# Patient Record
Sex: FEMALE | Race: WHITE | NOT HISPANIC OR LATINO | Employment: FULL TIME | URBAN - METROPOLITAN AREA
[De-identification: names, ages, dates, MRNs, and addresses within clinical notes are randomized per-mention and may not be internally consistent; named-entity substitution may affect disease eponyms.]

---

## 2018-10-05 ENCOUNTER — TRANSCRIBE ORDERS (OUTPATIENT)
Dept: LAB | Facility: HOSPITAL | Age: 23
End: 2018-10-05

## 2018-10-05 ENCOUNTER — HOSPITAL ENCOUNTER (OUTPATIENT)
Dept: ULTRASOUND IMAGING | Facility: HOSPITAL | Age: 23
Discharge: HOME | End: 2018-10-05
Admitting: PHYSICIAN ASSISTANT

## 2018-10-05 ENCOUNTER — TRANSCRIBE ORDERS (OUTPATIENT)
Dept: ADMINISTRATIVE | Facility: HOSPITAL | Age: 23
End: 2018-10-05

## 2018-10-05 ENCOUNTER — LAB (OUTPATIENT)
Dept: LAB | Facility: HOSPITAL | Age: 23
End: 2018-10-05

## 2018-10-05 DIAGNOSIS — R10.11 RUQ PAIN: ICD-10-CM

## 2018-10-05 DIAGNOSIS — R52 PAIN: Primary | ICD-10-CM

## 2018-10-05 DIAGNOSIS — R52 PAIN: ICD-10-CM

## 2018-10-05 DIAGNOSIS — R10.11 RUQ PAIN: Primary | ICD-10-CM

## 2018-10-05 LAB
ALBUMIN SERPL-MCNC: 4.4 G/DL (ref 3.2–4.8)
ALBUMIN/GLOB SERPL: 1.6 G/DL (ref 1.5–2.5)
ALP SERPL-CCNC: 178 U/L (ref 25–100)
ALT SERPL W P-5'-P-CCNC: 309 U/L (ref 7–40)
ANION GAP SERPL CALCULATED.3IONS-SCNC: 10 MMOL/L (ref 3–11)
AST SERPL-CCNC: 252 U/L (ref 0–33)
BASOPHILS # BLD AUTO: 0.02 10*3/MM3 (ref 0–0.2)
BASOPHILS NFR BLD AUTO: 0.3 % (ref 0–1)
BILIRUB CONJ SERPL-MCNC: 2.8 MG/DL (ref 0–0.2)
BILIRUB SERPL-MCNC: 4.2 MG/DL (ref 0.3–1.2)
BUN BLD-MCNC: 6 MG/DL (ref 9–23)
BUN/CREAT SERPL: 7.9 (ref 7–25)
CALCIUM SPEC-SCNC: 9.6 MG/DL (ref 8.7–10.4)
CHLORIDE SERPL-SCNC: 103 MMOL/L (ref 99–109)
CO2 SERPL-SCNC: 22 MMOL/L (ref 20–31)
CREAT BLD-MCNC: 0.76 MG/DL (ref 0.6–1.3)
DEPRECATED RDW RBC AUTO: 39.9 FL (ref 37–54)
EOSINOPHIL # BLD AUTO: 0.05 10*3/MM3 (ref 0–0.3)
EOSINOPHIL NFR BLD AUTO: 0.6 % (ref 0–3)
ERYTHROCYTE [DISTWIDTH] IN BLOOD BY AUTOMATED COUNT: 13.1 % (ref 11.3–14.5)
GFR SERPL CREATININE-BSD FRML MDRD: 95 ML/MIN/1.73
GLOBULIN UR ELPH-MCNC: 2.7 GM/DL
GLUCOSE BLD-MCNC: 100 MG/DL (ref 70–100)
HCT VFR BLD AUTO: 41.9 % (ref 34.5–44)
HGB BLD-MCNC: 13.7 G/DL (ref 11.5–15.5)
IMM GRANULOCYTES # BLD: 0.01 10*3/MM3 (ref 0–0.03)
IMM GRANULOCYTES NFR BLD: 0.1 % (ref 0–0.6)
LYMPHOCYTES # BLD AUTO: 1.42 10*3/MM3 (ref 0.6–4.8)
LYMPHOCYTES NFR BLD AUTO: 18.3 % (ref 24–44)
MCH RBC QN AUTO: 27.7 PG (ref 27–31)
MCHC RBC AUTO-ENTMCNC: 32.7 G/DL (ref 32–36)
MCV RBC AUTO: 84.6 FL (ref 80–99)
MONOCYTES # BLD AUTO: 0.68 10*3/MM3 (ref 0–1)
MONOCYTES NFR BLD AUTO: 8.7 % (ref 0–12)
NEUTROPHILS # BLD AUTO: 5.6 10*3/MM3 (ref 1.5–8.3)
NEUTROPHILS NFR BLD AUTO: 72 % (ref 41–71)
PLATELET # BLD AUTO: 378 10*3/MM3 (ref 150–450)
PMV BLD AUTO: 9.7 FL (ref 6–12)
POTASSIUM BLD-SCNC: 4.3 MMOL/L (ref 3.5–5.5)
PROT SERPL-MCNC: 7.1 G/DL (ref 5.7–8.2)
RBC # BLD AUTO: 4.95 10*6/MM3 (ref 3.89–5.14)
SODIUM BLD-SCNC: 135 MMOL/L (ref 132–146)
WBC NRBC COR # BLD: 7.78 10*3/MM3 (ref 3.5–10.8)

## 2018-10-05 PROCEDURE — 80053 COMPREHEN METABOLIC PANEL: CPT

## 2018-10-05 PROCEDURE — 36415 COLL VENOUS BLD VENIPUNCTURE: CPT

## 2018-10-05 PROCEDURE — 82248 BILIRUBIN DIRECT: CPT

## 2018-10-05 PROCEDURE — 76705 ECHO EXAM OF ABDOMEN: CPT

## 2018-10-05 PROCEDURE — 85025 COMPLETE CBC W/AUTO DIFF WBC: CPT

## 2018-10-06 ENCOUNTER — HOSPITAL ENCOUNTER (INPATIENT)
Facility: HOSPITAL | Age: 23
LOS: 7 days | Discharge: HOME | End: 2018-10-14
Attending: EMERGENCY MEDICINE | Admitting: SURGERY

## 2018-10-06 ENCOUNTER — TRANSCRIBE ORDERS (OUTPATIENT)
Dept: LAB | Facility: HOSPITAL | Age: 23
End: 2018-10-06

## 2018-10-06 ENCOUNTER — LAB (OUTPATIENT)
Dept: LAB | Facility: HOSPITAL | Age: 23
End: 2018-10-06

## 2018-10-06 DIAGNOSIS — R74.8 ABNORMAL LIVER ENZYMES: Primary | ICD-10-CM

## 2018-10-06 DIAGNOSIS — K80.20 CHOLELITHIASIS: ICD-10-CM

## 2018-10-06 DIAGNOSIS — R74.8 ABNORMAL LIVER ENZYMES: ICD-10-CM

## 2018-10-06 DIAGNOSIS — R74.8 ELEVATED LIVER ENZYMES: Primary | ICD-10-CM

## 2018-10-06 DIAGNOSIS — K80.50 CHOLEDOCHOLITHIASIS: ICD-10-CM

## 2018-10-06 DIAGNOSIS — K80.51 CALCULUS OF BILE DUCT WITHOUT CHOLANGITIS WITH OBSTRUCTION: ICD-10-CM

## 2018-10-06 LAB
ALBUMIN SERPL-MCNC: 4.37 G/DL (ref 3.2–4.8)
ALBUMIN SERPL-MCNC: 4.44 G/DL (ref 3.2–4.8)
ALBUMIN/GLOB SERPL: 1.4 G/DL (ref 1.5–2.5)
ALBUMIN/GLOB SERPL: 1.6 G/DL (ref 1.5–2.5)
ALP SERPL-CCNC: 199 U/L (ref 25–100)
ALP SERPL-CCNC: 209 U/L (ref 25–100)
ALT SERPL W P-5'-P-CCNC: 316 U/L (ref 7–40)
ALT SERPL W P-5'-P-CCNC: 341 U/L (ref 7–40)
ANION GAP SERPL CALCULATED.3IONS-SCNC: 10 MMOL/L (ref 3–11)
ANION GAP SERPL CALCULATED.3IONS-SCNC: 9 MMOL/L (ref 3–11)
AST SERPL-CCNC: 172 U/L (ref 0–33)
AST SERPL-CCNC: 203 U/L (ref 0–33)
B-HCG UR QL: NEGATIVE
BILIRUB SERPL-MCNC: 4.4 MG/DL (ref 0.3–1.2)
BILIRUB SERPL-MCNC: 5.3 MG/DL (ref 0.3–1.2)
BILIRUB UR QL STRIP: ABNORMAL
BUN BLD-MCNC: <5 MG/DL (ref 9–23)
BUN BLD-MCNC: <5 MG/DL (ref 9–23)
BUN/CREAT SERPL: ABNORMAL (ref 7–25)
BUN/CREAT SERPL: ABNORMAL (ref 7–25)
CALCIUM SPEC-SCNC: 9.5 MG/DL (ref 8.7–10.4)
CALCIUM SPEC-SCNC: 9.7 MG/DL (ref 8.7–10.4)
CHLORIDE SERPL-SCNC: 106 MMOL/L (ref 99–109)
CHLORIDE SERPL-SCNC: 106 MMOL/L (ref 99–109)
CLARITY UR: CLEAR
CO2 SERPL-SCNC: 22 MMOL/L (ref 20–31)
CO2 SERPL-SCNC: 24 MMOL/L (ref 20–31)
COLOR UR: YELLOW
CREAT BLD-MCNC: 0.69 MG/DL (ref 0.6–1.3)
CREAT BLD-MCNC: 0.73 MG/DL (ref 0.6–1.3)
GFR SERPL CREATININE-BSD FRML MDRD: 100 ML/MIN/1.73
GFR SERPL CREATININE-BSD FRML MDRD: 106 ML/MIN/1.73
GLOBULIN UR ELPH-MCNC: 2.8 GM/DL
GLOBULIN UR ELPH-MCNC: 3 GM/DL
GLUCOSE BLD-MCNC: 87 MG/DL (ref 70–100)
GLUCOSE BLD-MCNC: 99 MG/DL (ref 70–100)
GLUCOSE UR STRIP-MCNC: ABNORMAL MG/DL
HGB UR QL STRIP.AUTO: NEGATIVE
HOLD SPECIMEN: NORMAL
INTERNAL NEGATIVE CONTROL: NEGATIVE
INTERNAL POSITIVE CONTROL: POSITIVE
KETONES UR QL STRIP: NEGATIVE
LEUKOCYTE ESTERASE UR QL STRIP.AUTO: NEGATIVE
LIPASE SERPL-CCNC: 24 U/L (ref 6–51)
Lab: NORMAL
NITRITE UR QL STRIP: NEGATIVE
PH UR STRIP.AUTO: 6 [PH] (ref 5–8)
POTASSIUM BLD-SCNC: 3.7 MMOL/L (ref 3.5–5.5)
POTASSIUM BLD-SCNC: 4.3 MMOL/L (ref 3.5–5.5)
PROT SERPL-MCNC: 7.2 G/DL (ref 5.7–8.2)
PROT SERPL-MCNC: 7.4 G/DL (ref 5.7–8.2)
PROT UR QL STRIP: NEGATIVE
SODIUM BLD-SCNC: 137 MMOL/L (ref 132–146)
SODIUM BLD-SCNC: 140 MMOL/L (ref 132–146)
SP GR UR STRIP: <=1.005 (ref 1–1.03)
UROBILINOGEN UR QL STRIP: ABNORMAL

## 2018-10-06 PROCEDURE — 36415 COLL VENOUS BLD VENIPUNCTURE: CPT

## 2018-10-06 PROCEDURE — 25010000002 HYDROMORPHONE 1 MG/ML SOLUTION: Performed by: EMERGENCY MEDICINE

## 2018-10-06 PROCEDURE — 85025 COMPLETE CBC W/AUTO DIFF WBC: CPT | Performed by: EMERGENCY MEDICINE

## 2018-10-06 PROCEDURE — 80053 COMPREHEN METABOLIC PANEL: CPT

## 2018-10-06 PROCEDURE — 83690 ASSAY OF LIPASE: CPT

## 2018-10-06 PROCEDURE — 81025 URINE PREGNANCY TEST: CPT | Performed by: EMERGENCY MEDICINE

## 2018-10-06 PROCEDURE — 81003 URINALYSIS AUTO W/O SCOPE: CPT | Performed by: EMERGENCY MEDICINE

## 2018-10-06 PROCEDURE — 99284 EMERGENCY DEPT VISIT MOD MDM: CPT

## 2018-10-06 RX ORDER — SODIUM CHLORIDE 0.9 % (FLUSH) 0.9 %
10 SYRINGE (ML) INJECTION AS NEEDED
Status: DISCONTINUED | OUTPATIENT
Start: 2018-10-06 | End: 2018-10-14 | Stop reason: HOSPADM

## 2018-10-06 RX ADMIN — HYDROMORPHONE HYDROCHLORIDE 1 MG: 1 INJECTION, SOLUTION INTRAMUSCULAR; INTRAVENOUS; SUBCUTANEOUS at 23:46

## 2018-10-06 RX ADMIN — SODIUM CHLORIDE 1000 ML: 9 INJECTION, SOLUTION INTRAVENOUS at 23:43

## 2018-10-07 ENCOUNTER — ANESTHESIA (OUTPATIENT)
Dept: GASTROENTEROLOGY | Facility: HOSPITAL | Age: 23
End: 2018-10-07

## 2018-10-07 ENCOUNTER — APPOINTMENT (OUTPATIENT)
Dept: CT IMAGING | Facility: HOSPITAL | Age: 23
End: 2018-10-07

## 2018-10-07 ENCOUNTER — APPOINTMENT (OUTPATIENT)
Dept: MRI IMAGING | Facility: HOSPITAL | Age: 23
End: 2018-10-07

## 2018-10-07 ENCOUNTER — ANESTHESIA EVENT (OUTPATIENT)
Dept: GASTROENTEROLOGY | Facility: HOSPITAL | Age: 23
End: 2018-10-07

## 2018-10-07 ENCOUNTER — APPOINTMENT (OUTPATIENT)
Dept: GENERAL RADIOLOGY | Facility: HOSPITAL | Age: 23
End: 2018-10-07

## 2018-10-07 PROBLEM — R10.9 INTRACTABLE ABDOMINAL PAIN: Status: ACTIVE | Noted: 2018-10-07

## 2018-10-07 PROBLEM — R74.8 ELEVATED LIVER ENZYMES: Status: ACTIVE | Noted: 2018-10-07

## 2018-10-07 PROBLEM — K80.20 CHOLELITHIASIS: Status: ACTIVE | Noted: 2018-10-07

## 2018-10-07 PROBLEM — R17 JAUNDICE: Status: ACTIVE | Noted: 2018-10-07

## 2018-10-07 PROBLEM — E80.6 HYPERBILIRUBINEMIA: Status: ACTIVE | Noted: 2018-10-07

## 2018-10-07 PROBLEM — K80.50 CHOLEDOCHOLITHIASIS: Status: ACTIVE | Noted: 2018-10-07

## 2018-10-07 LAB
ALBUMIN SERPL-MCNC: 3.7 G/DL (ref 3.2–4.8)
ALBUMIN/GLOB SERPL: 1.6 G/DL (ref 1.5–2.5)
ALP SERPL-CCNC: 185 U/L (ref 25–100)
ALT SERPL W P-5'-P-CCNC: 269 U/L (ref 7–40)
ANION GAP SERPL CALCULATED.3IONS-SCNC: 7 MMOL/L (ref 3–11)
AST SERPL-CCNC: 128 U/L (ref 0–33)
BASOPHILS # BLD AUTO: 0.03 10*3/MM3 (ref 0–0.2)
BASOPHILS NFR BLD AUTO: 0.4 % (ref 0–1)
BILIRUB SERPL-MCNC: 4.6 MG/DL (ref 0.3–1.2)
BUN BLD-MCNC: <5 MG/DL (ref 9–23)
BUN/CREAT SERPL: ABNORMAL (ref 7–25)
CALCIUM SPEC-SCNC: 8.7 MG/DL (ref 8.7–10.4)
CHLORIDE SERPL-SCNC: 104 MMOL/L (ref 99–109)
CO2 SERPL-SCNC: 26 MMOL/L (ref 20–31)
CREAT BLD-MCNC: 0.65 MG/DL (ref 0.6–1.3)
DEPRECATED RDW RBC AUTO: 41.2 FL (ref 37–54)
DEPRECATED RDW RBC AUTO: 41.7 FL (ref 37–54)
EOSINOPHIL # BLD AUTO: 0.15 10*3/MM3 (ref 0–0.3)
EOSINOPHIL NFR BLD AUTO: 1.8 % (ref 0–3)
ERYTHROCYTE [DISTWIDTH] IN BLOOD BY AUTOMATED COUNT: 13.3 % (ref 11.3–14.5)
ERYTHROCYTE [DISTWIDTH] IN BLOOD BY AUTOMATED COUNT: 13.4 % (ref 11.3–14.5)
GFR SERPL CREATININE-BSD FRML MDRD: 114 ML/MIN/1.73
GLOBULIN UR ELPH-MCNC: 2.3 GM/DL
GLUCOSE BLD-MCNC: 96 MG/DL (ref 70–100)
HCT VFR BLD AUTO: 37.5 % (ref 34.5–44)
HCT VFR BLD AUTO: 40 % (ref 34.5–44)
HGB BLD-MCNC: 11.9 G/DL (ref 11.5–15.5)
HGB BLD-MCNC: 13.2 G/DL (ref 11.5–15.5)
IMM GRANULOCYTES # BLD: 0.01 10*3/MM3 (ref 0–0.03)
IMM GRANULOCYTES NFR BLD: 0.1 % (ref 0–0.6)
LYMPHOCYTES # BLD AUTO: 2.36 10*3/MM3 (ref 0.6–4.8)
LYMPHOCYTES NFR BLD AUTO: 28.9 % (ref 24–44)
MCH RBC QN AUTO: 27 PG (ref 27–31)
MCH RBC QN AUTO: 28.1 PG (ref 27–31)
MCHC RBC AUTO-ENTMCNC: 31.7 G/DL (ref 32–36)
MCHC RBC AUTO-ENTMCNC: 33 G/DL (ref 32–36)
MCV RBC AUTO: 85.1 FL (ref 80–99)
MCV RBC AUTO: 85.2 FL (ref 80–99)
MONOCYTES # BLD AUTO: 0.87 10*3/MM3 (ref 0–1)
MONOCYTES NFR BLD AUTO: 10.6 % (ref 0–12)
NEUTROPHILS # BLD AUTO: 4.75 10*3/MM3 (ref 1.5–8.3)
NEUTROPHILS NFR BLD AUTO: 58.2 % (ref 41–71)
PLATELET # BLD AUTO: 312 10*3/MM3 (ref 150–450)
PLATELET # BLD AUTO: 328 10*3/MM3 (ref 150–450)
PMV BLD AUTO: 10 FL (ref 6–12)
PMV BLD AUTO: 9.7 FL (ref 6–12)
POTASSIUM BLD-SCNC: 4 MMOL/L (ref 3.5–5.5)
PROT SERPL-MCNC: 6 G/DL (ref 5.7–8.2)
RBC # BLD AUTO: 4.4 10*6/MM3 (ref 3.89–5.14)
RBC # BLD AUTO: 4.7 10*6/MM3 (ref 3.89–5.14)
SODIUM BLD-SCNC: 137 MMOL/L (ref 132–146)
WBC NRBC COR # BLD: 6.03 10*3/MM3 (ref 3.5–10.8)
WBC NRBC COR # BLD: 8.17 10*3/MM3 (ref 3.5–10.8)
WHOLE BLOOD HOLD SPECIMEN: NORMAL

## 2018-10-07 PROCEDURE — 25010000002 PROPOFOL 10 MG/ML EMULSION: Performed by: ANESTHESIOLOGY

## 2018-10-07 PROCEDURE — 25010000002 FENTANYL CITRATE (PF) 100 MCG/2ML SOLUTION: Performed by: ANESTHESIOLOGY

## 2018-10-07 PROCEDURE — 80053 COMPREHEN METABOLIC PANEL: CPT | Performed by: PHYSICIAN ASSISTANT

## 2018-10-07 PROCEDURE — 25010000002 LEVOFLOXACIN PER 250 MG: Performed by: INTERNAL MEDICINE

## 2018-10-07 PROCEDURE — C1769 GUIDE WIRE: HCPCS | Performed by: INTERNAL MEDICINE

## 2018-10-07 PROCEDURE — 25010000002 DEXAMETHASONE PER 1 MG: Performed by: ANESTHESIOLOGY

## 2018-10-07 PROCEDURE — 25010000002 ONDANSETRON PER 1 MG: Performed by: NURSE PRACTITIONER

## 2018-10-07 PROCEDURE — C2617 STENT, NON-COR, TEM W/O DEL: HCPCS | Performed by: INTERNAL MEDICINE

## 2018-10-07 PROCEDURE — 25010000002 SUCCINYLCHOLINE PER 20 MG: Performed by: ANESTHESIOLOGY

## 2018-10-07 PROCEDURE — 25010000002 HYDROMORPHONE PER 4 MG: Performed by: INTERNAL MEDICINE

## 2018-10-07 PROCEDURE — 25010000002 IOPAMIDOL 61 % SOLUTION: Performed by: INTERNAL MEDICINE

## 2018-10-07 PROCEDURE — 85027 COMPLETE CBC AUTOMATED: CPT | Performed by: PHYSICIAN ASSISTANT

## 2018-10-07 PROCEDURE — 25010000002 ONDANSETRON PER 1 MG: Performed by: ANESTHESIOLOGY

## 2018-10-07 PROCEDURE — 74181 MRI ABDOMEN W/O CONTRAST: CPT

## 2018-10-07 PROCEDURE — 99223 1ST HOSP IP/OBS HIGH 75: CPT | Performed by: HOSPITALIST

## 2018-10-07 PROCEDURE — 99253 IP/OBS CNSLTJ NEW/EST LOW 45: CPT | Performed by: INTERNAL MEDICINE

## 2018-10-07 PROCEDURE — 25010000002 IOPAMIDOL 61 % SOLUTION: Performed by: EMERGENCY MEDICINE

## 2018-10-07 PROCEDURE — 74330 X-RAY BILE/PANC ENDOSCOPY: CPT

## 2018-10-07 PROCEDURE — 0F798DZ DILATION OF COMMON BILE DUCT WITH INTRALUMINAL DEVICE, VIA NATURAL OR ARTIFICIAL OPENING ENDOSCOPIC: ICD-10-PCS | Performed by: INTERNAL MEDICINE

## 2018-10-07 PROCEDURE — 74177 CT ABD & PELVIS W/CONTRAST: CPT

## 2018-10-07 DEVICE — BILIARY STENT
Type: IMPLANTABLE DEVICE | Site: BILE DUCT | Status: FUNCTIONAL
Brand: ADVANIX™ BILIARY

## 2018-10-07 RX ORDER — FENTANYL CITRATE 50 UG/ML
INJECTION, SOLUTION INTRAMUSCULAR; INTRAVENOUS AS NEEDED
Status: DISCONTINUED | OUTPATIENT
Start: 2018-10-07 | End: 2018-10-07 | Stop reason: SURG

## 2018-10-07 RX ORDER — FENTANYL CITRATE 50 UG/ML
50 INJECTION, SOLUTION INTRAMUSCULAR; INTRAVENOUS
Status: DISCONTINUED | OUTPATIENT
Start: 2018-10-07 | End: 2018-10-07 | Stop reason: HOSPADM

## 2018-10-07 RX ORDER — LIDOCAINE HYDROCHLORIDE 10 MG/ML
INJECTION, SOLUTION INFILTRATION; PERINEURAL AS NEEDED
Status: DISCONTINUED | OUTPATIENT
Start: 2018-10-07 | End: 2018-10-07 | Stop reason: SURG

## 2018-10-07 RX ORDER — SODIUM CHLORIDE 9 MG/ML
125 INJECTION, SOLUTION INTRAVENOUS CONTINUOUS
Status: DISCONTINUED | OUTPATIENT
Start: 2018-10-07 | End: 2018-10-07

## 2018-10-07 RX ORDER — ONDANSETRON 2 MG/ML
INJECTION INTRAMUSCULAR; INTRAVENOUS AS NEEDED
Status: DISCONTINUED | OUTPATIENT
Start: 2018-10-07 | End: 2018-10-07 | Stop reason: SURG

## 2018-10-07 RX ORDER — DEXAMETHASONE SODIUM PHOSPHATE 4 MG/ML
INJECTION, SOLUTION INTRA-ARTICULAR; INTRALESIONAL; INTRAMUSCULAR; INTRAVENOUS; SOFT TISSUE AS NEEDED
Status: DISCONTINUED | OUTPATIENT
Start: 2018-10-07 | End: 2018-10-07 | Stop reason: SURG

## 2018-10-07 RX ORDER — MAGNESIUM HYDROXIDE 1200 MG/15ML
LIQUID ORAL AS NEEDED
Status: DISCONTINUED | OUTPATIENT
Start: 2018-10-07 | End: 2018-10-07 | Stop reason: HOSPADM

## 2018-10-07 RX ORDER — SUCCINYLCHOLINE CHLORIDE 20 MG/ML
INJECTION INTRAMUSCULAR; INTRAVENOUS AS NEEDED
Status: DISCONTINUED | OUTPATIENT
Start: 2018-10-07 | End: 2018-10-07 | Stop reason: SURG

## 2018-10-07 RX ORDER — ONDANSETRON 2 MG/ML
4 INJECTION INTRAMUSCULAR; INTRAVENOUS ONCE
Status: COMPLETED | OUTPATIENT
Start: 2018-10-07 | End: 2018-10-07

## 2018-10-07 RX ORDER — SODIUM CHLORIDE, SODIUM LACTATE, POTASSIUM CHLORIDE, CALCIUM CHLORIDE 600; 310; 30; 20 MG/100ML; MG/100ML; MG/100ML; MG/100ML
INJECTION, SOLUTION INTRAVENOUS CONTINUOUS PRN
Status: DISCONTINUED | OUTPATIENT
Start: 2018-10-07 | End: 2018-10-07 | Stop reason: SURG

## 2018-10-07 RX ORDER — HYDROMORPHONE HYDROCHLORIDE 1 MG/ML
0.5 INJECTION, SOLUTION INTRAMUSCULAR; INTRAVENOUS; SUBCUTANEOUS
Status: DISCONTINUED | OUTPATIENT
Start: 2018-10-07 | End: 2018-10-07 | Stop reason: HOSPADM

## 2018-10-07 RX ORDER — SODIUM CHLORIDE 9 MG/ML
150 INJECTION, SOLUTION INTRAVENOUS CONTINUOUS
Status: ACTIVE | OUTPATIENT
Start: 2018-10-07 | End: 2018-10-09

## 2018-10-07 RX ORDER — LEVOFLOXACIN 5 MG/ML
500 INJECTION, SOLUTION INTRAVENOUS ONCE
Status: COMPLETED | OUTPATIENT
Start: 2018-10-07 | End: 2018-10-07

## 2018-10-07 RX ORDER — SODIUM CHLORIDE 0.9 % (FLUSH) 0.9 %
3-10 SYRINGE (ML) INJECTION AS NEEDED
Status: DISCONTINUED | OUTPATIENT
Start: 2018-10-07 | End: 2018-10-14 | Stop reason: HOSPADM

## 2018-10-07 RX ORDER — PROMETHAZINE HYDROCHLORIDE 25 MG/1
25 SUPPOSITORY RECTAL ONCE AS NEEDED
Status: DISCONTINUED | OUTPATIENT
Start: 2018-10-07 | End: 2018-10-07 | Stop reason: HOSPADM

## 2018-10-07 RX ORDER — PROMETHAZINE HYDROCHLORIDE 25 MG/1
25 TABLET ORAL ONCE AS NEEDED
Status: DISCONTINUED | OUTPATIENT
Start: 2018-10-07 | End: 2018-10-07 | Stop reason: HOSPADM

## 2018-10-07 RX ORDER — SODIUM CHLORIDE 0.9 % (FLUSH) 0.9 %
3 SYRINGE (ML) INJECTION EVERY 12 HOURS SCHEDULED
Status: DISCONTINUED | OUTPATIENT
Start: 2018-10-07 | End: 2018-10-14 | Stop reason: HOSPADM

## 2018-10-07 RX ORDER — ONDANSETRON 2 MG/ML
4 INJECTION INTRAMUSCULAR; INTRAVENOUS EVERY 6 HOURS PRN
Status: DISCONTINUED | OUTPATIENT
Start: 2018-10-07 | End: 2018-10-14 | Stop reason: HOSPADM

## 2018-10-07 RX ORDER — PROMETHAZINE HYDROCHLORIDE 25 MG/ML
6.25 INJECTION, SOLUTION INTRAMUSCULAR; INTRAVENOUS ONCE AS NEEDED
Status: DISCONTINUED | OUTPATIENT
Start: 2018-10-07 | End: 2018-10-07 | Stop reason: HOSPADM

## 2018-10-07 RX ORDER — HYDROMORPHONE HYDROCHLORIDE 1 MG/ML
0.25 INJECTION, SOLUTION INTRAMUSCULAR; INTRAVENOUS; SUBCUTANEOUS EVERY 4 HOURS PRN
Status: DISCONTINUED | OUTPATIENT
Start: 2018-10-07 | End: 2018-10-13

## 2018-10-07 RX ORDER — PROPOFOL 10 MG/ML
VIAL (ML) INTRAVENOUS AS NEEDED
Status: DISCONTINUED | OUTPATIENT
Start: 2018-10-07 | End: 2018-10-07 | Stop reason: SURG

## 2018-10-07 RX ADMIN — SODIUM CHLORIDE, POTASSIUM CHLORIDE, SODIUM LACTATE AND CALCIUM CHLORIDE: 600; 310; 30; 20 INJECTION, SOLUTION INTRAVENOUS at 11:38

## 2018-10-07 RX ADMIN — SUCCINYLCHOLINE CHLORIDE 100 MG: 20 INJECTION, SOLUTION INTRAMUSCULAR; INTRAVENOUS at 11:53

## 2018-10-07 RX ADMIN — ONDANSETRON HYDROCHLORIDE 4 MG: 2 INJECTION, SOLUTION INTRAMUSCULAR; INTRAVENOUS at 02:41

## 2018-10-07 RX ADMIN — SODIUM CHLORIDE 125 ML/HR: 9 INJECTION, SOLUTION INTRAVENOUS at 03:48

## 2018-10-07 RX ADMIN — HYDROMORPHONE HYDROCHLORIDE 0.25 MG: 1 INJECTION, SOLUTION INTRAMUSCULAR; INTRAVENOUS; SUBCUTANEOUS at 14:45

## 2018-10-07 RX ADMIN — LIDOCAINE HYDROCHLORIDE 50 MG: 10 INJECTION, SOLUTION INFILTRATION; PERINEURAL at 11:53

## 2018-10-07 RX ADMIN — FENTANYL CITRATE 100 MCG: 50 INJECTION, SOLUTION INTRAMUSCULAR; INTRAVENOUS at 11:53

## 2018-10-07 RX ADMIN — LEVOFLOXACIN 500 MG: 5 INJECTION, SOLUTION INTRAVENOUS at 12:03

## 2018-10-07 RX ADMIN — SODIUM CHLORIDE 100 ML/HR: 9 INJECTION, SOLUTION INTRAVENOUS at 05:55

## 2018-10-07 RX ADMIN — DEXAMETHASONE SODIUM PHOSPHATE 4 MG: 4 INJECTION, SOLUTION INTRAMUSCULAR; INTRAVENOUS at 11:59

## 2018-10-07 RX ADMIN — ONDANSETRON 4 MG: 2 INJECTION INTRAMUSCULAR; INTRAVENOUS at 12:57

## 2018-10-07 RX ADMIN — IOPAMIDOL 100 ML: 612 INJECTION, SOLUTION INTRAVENOUS at 00:21

## 2018-10-07 RX ADMIN — PROPOFOL 150 MG: 10 INJECTION, EMULSION INTRAVENOUS at 11:53

## 2018-10-07 RX ADMIN — SODIUM CHLORIDE 125 ML/HR: 9 INJECTION, SOLUTION INTRAVENOUS at 02:41

## 2018-10-07 RX ADMIN — BENZOCAINE AND MENTHOL 1 LOZENGE: 15; 3.6 LOZENGE ORAL at 16:41

## 2018-10-07 NOTE — CONSULTS
[facility] [place] [facility]ology Consult    Referring Provider: Felicita Mccall DO    PCP: Provider, No Known    Reason for Consultation: Choledocholithiasis    Chief complaint: RUQ pain    History of present illness:    Piyush Wren is a 23 y.o. female who is admitted with 1-week history of progressive RUQ pain, worse with fatty meals. There is associated N/V. Outside evaluation at [facility] showed gallstones on US and abnormal LFT's. CT shows biliary dilation.    Allergies:  Penicillins    Scheduled Meds:    sodium chloride 3 mL Intravenous Q12H        Infusions:    sodium chloride 100 mL/hr Last Rate: 100 mL/hr (10/07/18 0555)       PRN Meds:  •  ondansetron  •  sodium chloride  •  sodium chloride    Home Meds:  Birth control pills    ROS: Review of Systems   Constitutional: Negative.  Negative for activity change, appetite change, chills, diaphoresis, fatigue and fever.   HENT: Negative.  Negative for mouth sores, nosebleeds and trouble swallowing.    Eyes: Negative.    Respiratory: Negative.  Negative for cough, chest tightness, shortness of breath, wheezing and stridor.    Cardiovascular: Negative for chest pain.   Gastrointestinal: Positive for abdominal pain, constipation, nausea and vomiting. Negative for abdominal distention, blood in stool and diarrhea.   Endocrine: Negative.    Genitourinary: Negative.  Negative for difficulty urinating and dysuria.   Musculoskeletal: Negative.  Negative for arthralgias, back pain, gait problem, joint swelling and myalgias.   Skin: Negative.  Negative for color change, pallor and rash.   Allergic/Immunologic: Negative.    Neurological: Negative.  Negative for tremors, seizures, syncope, weakness, light-headedness, numbness and headaches.   Hematological: Negative.  Negative for adenopathy. Does not bruise/bleed easily.   Psychiatric/Behavioral: Negative.  Negative for agitation and behavioral problems.   All other systems reviewed and are negative.      PAST MED HX:  Polycystic ovary  "syndrome    PAST SURG HX:  History reviewed. No pertinent surgical history.    FAM HX:  History reviewed. No pertinent family history.    SOC HX:  Social History     Social History   • Marital status: Single     Spouse name: N/A   • Number of children: N/A   • Years of education: N/A     Occupational History   • Not on file.     Social History Main Topics   • Smoking status: Never Smoker   • Smokeless tobacco: Not on file   • Alcohol use No   • Drug use: No   • Sexual activity: Not on file     Other Topics Concern   • Not on file     Social History Narrative   • No narrative on file       PHYSICAL EXAM  /61 (BP Location: Right arm, Patient Position: Lying)   Pulse 68   Temp 98.6 °F (37 °C) (Oral)   Resp 17   Ht 170.2 cm (67\")   Wt 103 kg (226 lb 1.6 oz)   LMP 09/27/2018 Comment: neg hcg 10/6/2018  SpO2 100%   BMI 35.41 kg/m²   Wt Readings from Last 3 Encounters:   10/07/18 103 kg (226 lb 1.6 oz)   ,body mass index is 35.41 kg/m².  Physical Exam   Constitutional: She is oriented to person, place, and time. She appears well-developed and well-nourished.   HENT:   Head: Normocephalic.   Eyes: Conjunctivae are normal. Scleral icterus is present.   Neck: Normal range of motion.   Cardiovascular: Normal rate and regular rhythm.    No murmur heard.  Pulmonary/Chest: Effort normal and breath sounds normal. No respiratory distress. She has no wheezes. She has no rales.   Abdominal: Soft. Bowel sounds are normal. She exhibits no distension and no mass. There is no guarding.   +Mild tenderness RUQ   Genitourinary:   Genitourinary Comments: Deferred   Musculoskeletal: Normal range of motion. She exhibits no edema.   Neurological: She is alert and oriented to person, place, and time.   Skin: Skin is warm and dry. Capillary refill takes less than 2 seconds. No rash noted.   Psychiatric: She has a normal mood and affect. Her behavior is normal.   Nursing note and vitals reviewed.      Results Review:   I reviewed the " patient's new clinical results.    Lab Results   Component Value Date    WBC 6.03 10/07/2018    HGB 11.9 10/07/2018    HGB 13.2 10/06/2018    HGB 13.7 10/05/2018    HCT 37.5 10/07/2018    MCV 85.2 10/07/2018     10/07/2018       No results found for: INR    Lab Results   Component Value Date    GLUCOSE 96 10/07/2018    BUN <5 (L) 10/07/2018    CREATININE 0.65 10/07/2018    EGFRIFNONA 114 10/07/2018    BCR  10/07/2018      Comment:      Unable to calculate Bun/Crea Ratio.    CO2 26.0 10/07/2018    CALCIUM 8.7 10/07/2018    ALBUMIN 3.70 10/07/2018    ALKPHOS 185 (H) 10/07/2018    BILITOT 4.6 (H) 10/07/2018    BILIDIR 2.8 (H) 10/05/2018     (H) 10/07/2018     (H) 10/07/2018       ASSESSMENTS/PLANS    1. Choledocholithiasis with obstruction. Personal review of MRCP films shows dilated biliary tree and filling defect in distal CBD.  2. Cholestatic LFT elevations.  3. Nausea and vomiting, resolved.  4. RUQ pain, controlled with opiates.    >> ERCP today.  >> Consult surgery for cholecystectomy.    Reviewed risk and benefit of ERCP with the patient, including risk of bleeding, bowel perforation, pancreatitis, and death. Discussed risk of leaving stone in the bile duct, including further pain, bile duct infection, and pancreatitis. Patient wishes to proceed with ERCP.    I discussed the patients findings and my recommendations with patient, family, and Dr. Mccall.    Mark I. Brunner, MD  10/07/18  9:30 AM

## 2018-10-07 NOTE — ANESTHESIA PREPROCEDURE EVALUATION
Anesthesia Evaluation     Patient summary reviewed and Nursing notes reviewed   no history of anesthetic complications:  NPO Solid Status: > 8 hours  NPO Liquid Status: > 8 hours           Airway   Mallampati: II  TM distance: >3 FB  Neck ROM: full  No difficulty expected  Dental - normal exam     Pulmonary - negative pulmonary ROS and normal exam    breath sounds clear to auscultation  Cardiovascular - negative cardio ROS and normal exam    Rhythm: regular  Rate: normal        Neuro/Psych- negative ROS  GI/Hepatic/Renal/Endo    (+) obesity,       ROS Comment: Choledocholithiasis/jaundice    Musculoskeletal (-) negative ROS    Abdominal    Substance History - negative use     OB/GYN          Other - negative ROS                       Anesthesia Plan    ASA 2 - emergent     general     intravenous induction   Anesthetic plan, all risks, benefits, and alternatives have been provided, discussed and informed consent has been obtained with: patient.

## 2018-10-07 NOTE — BRIEF OP NOTE
ENDOSCOPIC RETROGRADE CHOLANGIOPANCREATOGRAPHY  Progress Note    Piyush Wren  10/6/2018 - 10/7/2018    ERCP shows a large obstructing stone in the lower CBD, unable to be removed. 10 Fr x 7 cm temporary stent placed, with excellent flow of clear bile.    >> Repeat ERCP in ~4 weeks to remove stone and stent, +/- Spyglass lithotripsy    Mark I. Brunner, MD     Date: 10/7/2018  Time: 1:05 PM

## 2018-10-07 NOTE — ANESTHESIA POSTPROCEDURE EVALUATION
Patient: Piyush Wren    Procedure Summary     Date:  10/07/18 Room / Location:  [facility] ENDOSCOPY 1 /  [facility]    Anesthesia Start:  1149 Anesthesia Stop:  1309    Procedure:  ENDOSCOPIC RETROGRADE CHOLANGIOPANCREATOGRAPHY (N/A ) Diagnosis:      Surgeon:  Brunner, Mark I, MD Provider:  He Gupta MD    Anesthesia Type:  general ASA Status:  2 - Emergent          Anesthesia Type: general  Last vitals  BP   149/84 (10/07/18 1309)   Temp   98.2 °F (36.8 °C) (10/07/18 1309)   Pulse   95 (10/07/18 1309)   Resp   18 (10/07/18 1308)     SpO2   99 % (10/07/18 1309)     Post Anesthesia Care and Evaluation    Patient location during evaluation: PACU  Patient participation: complete - patient participated  Level of consciousness: awake and alert  Pain score: 0  Pain management: adequate  Airway patency: patent  Anesthetic complications: No anesthetic complications  PONV Status: none  Cardiovascular status: hemodynamically stable and acceptable  Respiratory status: nonlabored ventilation, acceptable and nasal cannula  Hydration status: acceptable

## 2018-10-07 NOTE — ED PROVIDER NOTES
"Subjective   Piyush Wren is a 23 y.o.female who presents to the ED with c/o abdominal pain with onset one week ago. She states that she went to a [facility] yesterday for RUQ abdominal pain and nausea, where she had labs drawn and a ultrasound of her gallblader. She states she had abnormal liver enzymes and was told she has a gall stone. She reports her symptoms have gradually worsened today, especially after eating, and states her abdominal pain radiated to her mid back. She notes she called her PCP at that time, who recommended she come to the ED for further evaluation. She also complains of chills and \"hot flashes\". She reports one episode of vomiting two days ago, but denies any vomiting since. She also notes recently decreased appetite, and states she her last normal meal was pasta around 1730 today. Ms. Wren denies any shortness of breath or any other acute complaints. She notes past surgical history of a cyst removal from her abdomen, but denies any other abdominal surgeries. She denies any current chance of pregnancy. The patient denies any recent sick contacts.        History provided by:  Patient  Abdominal Pain   Pain location:  RUQ  Pain radiates to:  Back  Pain severity:  Moderate  Onset quality:  Sudden  Duration:  1 week  Timing:  Constant  Progression:  Worsening  Chronicity:  New  Context: not sick contacts    Relieved by:  None tried  Worsened by:  Eating  Ineffective treatments:  None tried  Associated symptoms: chills, nausea and vomiting (currently resolved)    Associated symptoms: no shortness of breath    Risk factors: not pregnant        Review of Systems   Constitutional: Positive for appetite change (recent reduced appetite) and chills.        Patient complains of \"hot flashes\"   Respiratory: Negative for shortness of breath.    Gastrointestinal: Positive for abdominal pain (RUQ pain, which radiates to the mid back), nausea and vomiting (currently resolved).   Musculoskeletal: Positive for " back pain.   All other systems reviewed and are negative.      History reviewed. No pertinent past medical history.    Allergies   Allergen Reactions   • Penicillins Rash       History reviewed. No pertinent surgical history.    History reviewed. No pertinent family history.    Social History     Social History   • Marital status: Single     Social History Main Topics   • Smoking status: Never Smoker   • Alcohol use No   • Drug use: No     Other Topics Concern   • Not on file         Objective   Physical Exam   Constitutional: She is oriented to person, place, and time. She appears well-developed and well-nourished. No distress.   HENT:   Head: Normocephalic and atraumatic.   Eyes: Conjunctivae are normal. No scleral icterus.   Neck: Normal range of motion. Neck supple.   Cardiovascular: Normal rate, regular rhythm and normal heart sounds.    No murmur heard.  Pulmonary/Chest: Effort normal and breath sounds normal. No respiratory distress.   Abdominal: Soft. Bowel sounds are normal. There is tenderness. There is no rebound and no guarding.   Moderate tenderness in the RUQ and mild tenderness in the epigastrium.   Musculoskeletal: Normal range of motion. She exhibits no tenderness.   Neurological: She is alert and oriented to person, place, and time.   Skin: Skin is warm and dry. She is not diaphoretic.   Psychiatric: She has a normal mood and affect. Her behavior is normal.   Nursing note and vitals reviewed.      Procedures         ED Course  ED Course as of Oct 07 0224   Sat Oct 06, 2018   2332 Bilirubin, UA: (!) Moderate (2+) [ML]   Sun Oct 07, 2018   0150 Lymphocyte %: 28.9 [ML]      ED Course User Index  [ML] Carrol Pang APRN      Recent Results (from the past 24 hour(s))   Comprehensive Metabolic Panel    Collection Time: 10/06/18 12:28 PM   Result Value Ref Range    Glucose 87 70 - 100 mg/dL    BUN <5 (L) 9 - 23 mg/dL    Creatinine 0.73 0.60 - 1.30 mg/dL    Sodium 140 132 - 146 mmol/L    Potassium  4.3 3.5 - 5.5 mmol/L    Chloride 106 99 - 109 mmol/L    CO2 24.0 20.0 - 31.0 mmol/L    Calcium 9.7 8.7 - 10.4 mg/dL    Total Protein 7.2 5.7 - 8.2 g/dL    Albumin 4.44 3.20 - 4.80 g/dL    ALT (SGPT) 341 (H) 7 - 40 U/L    AST (SGOT) 203 (H) 0 - 33 U/L    Alkaline Phosphatase 209 (H) 25 - 100 U/L    Total Bilirubin 5.3 (H) 0.3 - 1.2 mg/dL    eGFR Non African Amer 100 >60 mL/min/1.73    Globulin 2.8 gm/dL    A/G Ratio 1.6 1.5 - 2.5 g/dL    BUN/Creatinine Ratio  7.0 - 25.0    Anion Gap 10.0 3.0 - 11.0 mmol/L   Comprehensive Metabolic Panel    Collection Time: 10/06/18 10:29 PM   Result Value Ref Range    Glucose 99 70 - 100 mg/dL    BUN <5 (L) 9 - 23 mg/dL    Creatinine 0.69 0.60 - 1.30 mg/dL    Sodium 137 132 - 146 mmol/L    Potassium 3.7 3.5 - 5.5 mmol/L    Chloride 106 99 - 109 mmol/L    CO2 22.0 20.0 - 31.0 mmol/L    Calcium 9.5 8.7 - 10.4 mg/dL    Total Protein 7.4 5.7 - 8.2 g/dL    Albumin 4.37 3.20 - 4.80 g/dL    ALT (SGPT) 316 (H) 7 - 40 U/L    AST (SGOT) 172 (H) 0 - 33 U/L    Alkaline Phosphatase 199 (H) 25 - 100 U/L    Total Bilirubin 4.4 (H) 0.3 - 1.2 mg/dL    eGFR Non African Amer 106 >60 mL/min/1.73    Globulin 3.0 gm/dL    A/G Ratio 1.4 (L) 1.5 - 2.5 g/dL    BUN/Creatinine Ratio  7.0 - 25.0    Anion Gap 9.0 3.0 - 11.0 mmol/L   Lipase    Collection Time: 10/06/18 10:29 PM   Result Value Ref Range    Lipase 24 6 - 51 U/L   Green Top (Gel)    Collection Time: 10/06/18 10:29 PM   Result Value Ref Range    Extra Tube Hold for add-ons.    Urinalysis With Microscopic If Indicated (No Culture) - Urine, Clean Catch    Collection Time: 10/06/18 11:20 PM   Result Value Ref Range    Color, UA Yellow Yellow, Straw    Appearance, UA Clear Clear    pH, UA 6.0 5.0 - 8.0    Specific Gravity, UA <=1.005 1.005 - 1.030    Glucose,  mg/dL (1+) (A) Negative    Ketones, UA Negative Negative    Bilirubin, UA Moderate (2+) (A) Negative    Blood, UA Negative Negative    Protein, UA Negative Negative    Leuk Esterase, UA  Negative Negative    Nitrite, UA Negative Negative    Urobilinogen, UA 0.2 E.U./dL 0.2 - 1.0 E.U./dL   POCT pregnancy, urine    Collection Time: 10/06/18 11:23 PM   Result Value Ref Range    HCG, Urine, QL Negative Negative    Lot Number [id number]     Internal Positive Control Positive     Internal Negative Control Negative    Lavender Top    Collection Time: 10/06/18 11:56 PM   Result Value Ref Range    Extra Tube hold for add-on    CBC Auto Differential    Collection Time: 10/06/18 11:56 PM   Result Value Ref Range    WBC 8.17 3.50 - 10.80 10*3/mm3    RBC 4.70 3.89 - 5.14 10*6/mm3    Hemoglobin 13.2 11.5 - 15.5 g/dL    Hematocrit 40.0 34.5 - 44.0 %    MCV 85.1 80.0 - 99.0 fL    MCH 28.1 27.0 - 31.0 pg    MCHC 33.0 32.0 - 36.0 g/dL    RDW 13.3 11.3 - 14.5 %    RDW-SD 41.2 37.0 - 54.0 fl    MPV 10.0 6.0 - 12.0 fL    Platelets 328 150 - 450 10*3/mm3    Neutrophil % 58.2 41.0 - 71.0 %    Lymphocyte % 28.9 24.0 - 44.0 %    Monocyte % 10.6 0.0 - 12.0 %    Eosinophil % 1.8 0.0 - 3.0 %    Basophil % 0.4 0.0 - 1.0 %    Immature Grans % 0.1 0.0 - 0.6 %    Neutrophils, Absolute 4.75 1.50 - 8.30 10*3/mm3    Lymphocytes, Absolute 2.36 0.60 - 4.80 10*3/mm3    Monocytes, Absolute 0.87 0.00 - 1.00 10*3/mm3    Eosinophils, Absolute 0.15 0.00 - 0.30 10*3/mm3    Basophils, Absolute 0.03 0.00 - 0.20 10*3/mm3    Immature Grans, Absolute 0.01 0.00 - 0.03 10*3/mm3     Note: In addition to lab results from this visit, the labs listed above may include labs taken at another facility or during a different encounter within the last 24 hours. Please correlate lab times with ED admission and discharge times for further clarification of the services performed during this visit.    CT Abdomen Pelvis With Contrast   Final Result     Diffuse dilatation of the biliary tree and gallbladder without visualized    stone or pericholecystic inflammation.        Abrupt tapering of the common bile duct within the pancreatic head without    visualized  "pancreatic lesion.  No pancreatic ductal dilatation.  Findings could    reflect a stricture or a nonvisualized pancreatic lesion.  GI evaluation    recommended.      THIS DOCUMENT HAS BEEN ELECTRONICALLY SIGNED BY TOBY FORTUNE MD        Vitals:    10/06/18 2103   BP: 138/83   BP Location: Left arm   Patient Position: Sitting   Pulse: 98   Resp: 16   Temp: 98.2 °F (36.8 °C)   TempSrc: Oral   SpO2: 98%   Weight: 103 kg (226 lb)   Height: 170.2 cm (67\")     Medications   sodium chloride 0.9 % flush 10 mL (not administered)   sodium chloride 0.9 % bolus 1,000 mL (1,000 mL Intravenous New Bag 10/6/18 2343)   HYDROmorphone (DILAUDID) injection 1 mg (1 mg Intravenous Given 10/6/18 2346)   iopamidol (ISOVUE-300) 61 % injection 100 mL (100 mL Intravenous Given 10/7/18 0021)     ECG/EMG Results (last 24 hours)     ** No results found for the last 24 hours. **                          [facility]    Final diagnoses:   Elevated liver enzymes       Documentation assistance provided by palmer Phelps.  Information recorded by the palmer was done at my direction and has been verified and validated by me.     Brent Phelps  10/06/18 6952       Carrol Pang APRN  10/07/18 0224    "

## 2018-10-07 NOTE — PROGRESS NOTES
[facility] Medicine Services  PROGRESS NOTE    Patient Name: Piyush Wren  : 1995  MRN: [id number]    Date of Admission: 10/6/2018  Length of Stay: 0  Primary Care Physician: Provider, No Known    Subjective   Subjective     CC:  abd pain     HPI:  2nd visit today, pt seen earlier by my partner     Review of Systems      Otherwise ROS is negative except as mentioned in the HPI.    Objective   Objective     Vital Signs:   Temp:  [98.2 °F (36.8 °C)-98.8 °F (37.1 °C)] 98.6 °F (37 °C)  Heart Rate:  [68-98] 68  Resp:  [16-18] 17  BP: (113-138)/(61-86) 113/61        Physical Exam:      Results Reviewed:  I have personally reviewed current lab, radiology, and data and agree.      Results from last 7 days  Lab Units 10/07/18  0609 10/06/18  2356 10/05/18  1116   WBC 10*3/mm3 6.03 8.17 7.78   HEMOGLOBIN g/dL 11.9 13.2 13.7   HEMATOCRIT % 37.5 40.0 41.9   PLATELETS 10*3/mm3 312 328 378       Results from last 7 days  Lab Units 10/07/18  0609 10/06/18  2229 10/06/18  1228   SODIUM mmol/L 137 137 140   POTASSIUM mmol/L 4.0 3.7 4.3   CHLORIDE mmol/L 104 106 106   CO2 mmol/L 26.0 22.0 24.0   BUN mg/dL <5* <5* <5*   CREATININE mg/dL 0.65 0.69 0.73   GLUCOSE mg/dL 96 99 87   CALCIUM mg/dL 8.7 9.5 9.7   ALT (SGPT) U/L 269* 316* 341*   AST (SGOT) U/L 128* 172* 203*     Estimated Creatinine Clearance: 167.6 mL/min (by C-G formula based on SCr of 0.65 mg/dL).  No results found for: BNP    Microbiology Results Abnormal     None          Imaging Results (last 24 hours)     Procedure Component Value Units Date/Time    MRI abdomen wo contrast mrcp [[id number]] Resulted:  10/07/18 0508     Updated:  10/07/18 0557    CT Abdomen Pelvis With Contrast [[id number]] Collected:  10/06/18 2327     Updated:  10/07/18 0132    Narrative:       EXAM:    CT Abdomen and Pelvis With Intravenous Contrast    EXAM DATE/TIME:    10/6/2018 11:27 PM    CLINICAL HISTORY:    23 years old, female; Elevated liver enzymes.  Epigastric pain, n/v,   constipation    TECHNIQUE:    Axial computed tomography images of the abdomen and pelvis with intravenous   contrast.  All CT scans at this facility use at least one of these dose   optimization techniques: automated exposure control; mA and/or kV adjustment   per patient size (includes targeted exams where dose is matched to clinical   indication); or iterative reconstruction.    Coronal and sagittal reformatted images were created and reviewed.    CONTRAST:    100 mL of iso 300 administered intravenously.      COMPARISON:    US GALLBLADDER 10/5/2018 1:03 PM    FINDINGS:    There is moderate to severe diffuse intra-and extrahepatic biliary   dilatation, the common bile duct measuring up to 1.9 cm. the gallbladder is   distended, measuring up to 11 cm.  No pericholecystic inflammation.  No   visualized gallstone or biliary stone.      The common bile duct tapers abruptly within the pancreatic head.  However,   there is no visualized pancreatic lesion.  There is no acute pancreatitis or   pancreatic ductal dilatation.      The lung bases, liver, spleen, adrenal glands, kidneys, uterus, ovaries, and   urinary bladder are normal.      There is no bowel inflammation, obstruction, free intraperitoneal air, or   ascites.  The appendix is normal.      No acute osseous abnormality.      Impression:         Diffuse dilatation of the biliary tree and gallbladder without visualized   stone or pericholecystic inflammation.      Abrupt tapering of the common bile duct within the pancreatic head without   visualized pancreatic lesion.  No pancreatic ductal dilatation.  Findings could   reflect a stricture or a nonvisualized pancreatic lesion.  GI evaluation   recommended.    THIS DOCUMENT HAS BEEN ELECTRONICALLY SIGNED BY TOBY FORTUNE MD             I have reviewed the medications.      sodium chloride 3 mL Intravenous Q12H         Assessment/Plan   Assessment / Plan     Hospital Problem List     *  (Principal)Intractable abdominal pain    Elevated liver enzymes    Hyperbilirubinemia    Cholelithiasis    Jaundice             Brief Hospital Course to date:  Piyush Wren is a 23 y.o. female with no significant past medical history who presents with complaints of RUQ abdominal pain progressively worsening over the past week. Pain is constant, sharp, and radiates to lower abdomen and upper back. Seems worse with PO intake and movement. There is associated nausea with vomiting X1 episode, generalized weakness and constipation. Patient was initially evaluated yesterday at [facility] where she underwent US gallbladder and laboratory studies. States she was told she had elevated liver enzymes and gallstones. She called to alert her PCP and subsequently directed to  ED for further evaluation and treatment.      Assessment & Plan:  1. Intractable abdominal pain:  - with elevated liver enzymes and gallstones demonstrated on imaging. Suspect obstruction. There are some CBD abnormalities.  - will obtain MRCP today and consulted GI  - consider general surgery consult in am, hold for now   - avoid hepatotoxins  - NPO for now  - analgesia and antiemetics as needed     DVT prophylaxis: mechanical        CODE STATUS:   Code Status and Medical Interventions:   Ordered at: 10/07/18 0238     Level Of Support Discussed With:    Patient     Code Status:    CPR     Medical Interventions (Level of Support Prior to Arrest):    Full       Disposition: I expect the patient to be discharged tbd      Electronically signed by Felicita Mccall DO, 10/07/18, 8:59 AM.

## 2018-10-07 NOTE — ANESTHESIA PROCEDURE NOTES
Airway  Urgency: elective    Airway not difficult    General Information and Staff    Patient location during procedure: OR    Indications and Patient Condition  Indications for airway management: airway protection    Preoxygenated: yes  MILS not maintained throughout  Mask difficulty assessment: 1 - vent by mask    Final Airway Details  Final airway type: endotracheal airway      Successful airway: ETT  Cuffed: yes   Successful intubation technique: direct laryngoscopy  Endotracheal tube insertion site: oral  Blade: Luis Eduardo  Blade size: 3  ETT size: 7.0 mm  Cormack-Lehane Classification: grade I - full view of glottis  Placement verified by: chest auscultation and capnometry   Measured from: lips  ETT to lips (cm): 20  Number of attempts at approach: 1    Additional Comments  Negative epigastric sounds, Breath sound equal bilaterally with symmetric chest rise and fall

## 2018-10-07 NOTE — PLAN OF CARE
Problem: Patient Care Overview  Goal: Plan of Care Review  Outcome: Ongoing (interventions implemented as appropriate)   10/07/18 0534   Coping/Psychosocial   Plan of Care Reviewed With patient   Plan of Care Review   Progress no change   OTHER   Outcome Summary Pt VSS, no complaints of pain since coming up to floor, MRI completed, aunt at bedside, will cont. to monitor.        Problem: Pain, Acute (Adult)  Goal: Identify Related Risk Factors and Signs and Symptoms  Outcome: Outcome(s) achieved Date Met: 10/07/18    Goal: Acceptable Pain Control/Comfort Level  Outcome: Ongoing (interventions implemented as appropriate)

## 2018-10-07 NOTE — H&P
[facility] Medicine Services  HISTORY AND PHYSICAL    Patient Name: Piyush Wren  : 1995  MRN: [id number]  Primary Care Physician: Provider, No Known - Selina Otoole NP - Soraida Go  Date of admission: 10/6/2018      Subjective   Subjective     Chief Complaint:  RUQ pain    HPI:  Piyush Wren is a 23 y.o. female with no significant past medical history who presents with complaints of RUQ abdominal pain progressively worsening over the past week. Pain is constant, sharp, and radiates to lower abdomen and upper back. Seems worse with PO intake and movement. There is associated nausea with vomiting X1 episode, generalized weakness and constipation. Patient was initially evaluated yesterday at [facility] where she underwent US gallbladder and laboratory studies. States she was told she had elevated liver enzymes and gallstones. She called to alert her PCP and subsequently directed to  ED for further evaluation and treatment. She has no complaints of cough, congestion, fever, abdominal vaginal bleeding/discharge, dysuria, or hematuria. No other complaints at this time. She has no history abdominal surgeries. No sick contacts. Has never been pregnant and denies change of current pregnancy. Will admit to for further evaluation and treatment.    Emergency Department Evaluation; vitals stable. Alk phos 200. . . Chemistry and hematology otherwise favorable. Ct abdomen demonstrates Diffuse dilatation of the biliary tree and gallbladder without visualized stone or pericholecystic inflammation. US notes chololithiasis with acute cholecystitis. HCG negative.    Review of Systems   Constitutional: Positive for appetite change and chills. Negative for fever.   HENT: Negative for congestion and trouble swallowing.    Eyes: Negative for photophobia and visual disturbance.   Respiratory: Negative for cough and shortness of breath.    Cardiovascular: Negative for chest pain and  leg swelling.   Gastrointestinal: Positive for abdominal pain, constipation and vomiting. Negative for diarrhea and nausea.   Endocrine: Negative for cold intolerance and heat intolerance.   Genitourinary: Negative for dysuria and flank pain.   Musculoskeletal: Negative for back pain.   Skin: Negative for pallor and rash.   Allergic/Immunologic: Negative for immunocompromised state.   Neurological: Negative for dizziness, weakness and light-headedness.   Hematological: Negative for adenopathy.   Psychiatric/Behavioral: Negative for agitation and confusion.          Otherwise 10-system ROS reviewed and is negative except as mentioned in the HPI.    Personal History     History reviewed. No pertinent past medical history.  PCOS  Depression    History reviewed. No pertinent surgical history.  Pilonidal Cyst removal    Family History: family history is not on file.   Father - HTN  Mother - Thyroid      Social History:  reports that she has never smoked. She does not have any smokeless tobacco history on file. She reports that she does not drink alcohol or use drugs.  Social History     Social History Narrative   • No narrative on file   Single  No kids  Student  Denies smoking  Denies drug abuse  Denies alcohol abuse    Medications:  Available home medication information reviewed     Allergies   Allergen Reactions   • Penicillins Rash       Objective   Objective     Vital Signs:   Temp:  [98.2 °F (36.8 °C)] 98.2 °F (36.8 °C)  Heart Rate:  [97-98] 97  Resp:  [16] 16  BP: (114-138)/(70-86) 114/70      Physical Exam   Constitutional: No acute distress, awake, alert  Eyes: PERRLA, sclerae anicteric, no conjunctival injection  HENT: NCAT, mucous membranes moist  Neck: Supple, no thyromegaly, no lymphadenopathy, trachea midline  Respiratory: Clear to auscultation bilaterally, nonlabored respirations   Cardiovascular: RRR, no murmurs, rubs, or gallops, palpable pedal pulses bilaterally  Gastrointestinal: Positive bowel sounds,  soft, nondistended there is some mild tenderness to palpation  Musculoskeletal: No bilateral ankle edema, no clubbing or cyanosis to extremities  Psychiatric: Appropriate affect, cooperative  Neurologic: Oriented x 3, strength symmetric in all extremities, Cranial Nerves grossly intact to confrontation, speech clear  Skin: No rashes    Results Reviewed:  I have personally reviewed current lab, radiology, and data and agree.      Results from last 7 days  Lab Units 10/06/18  2356   WBC 10*3/mm3 8.17   HEMOGLOBIN g/dL 13.2   HEMATOCRIT % 40.0   PLATELETS 10*3/mm3 328       Results from last 7 days  Lab Units 10/06/18  2229   SODIUM mmol/L 137   POTASSIUM mmol/L 3.7   CHLORIDE mmol/L 106   CO2 mmol/L 22.0   BUN mg/dL <5*   CREATININE mg/dL 0.69   GLUCOSE mg/dL 99   CALCIUM mg/dL 9.5   ALT (SGPT) U/L 316*   AST (SGOT) U/L 172*     Estimated Creatinine Clearance: 157.9 mL/min (by C-G formula based on SCr of 0.69 mg/dL).  Brief Urine Lab Results  (Last result in the past 365 days)      Color   Clarity   Blood   Leuk Est   Nitrite   Protein   CREAT   Urine HCG        10/06/18 2323               Negative         No results found for: BNP  Imaging Results (last 24 hours)     Procedure Component Value Units Date/Time    CT Abdomen Pelvis With Contrast [[id number]] Collected:  10/06/18 2327     Updated:  10/07/18 0132    Narrative:       EXAM:    CT Abdomen and Pelvis With Intravenous Contrast    EXAM DATE/TIME:    10/6/2018 11:27 PM    CLINICAL HISTORY:    23 years old, female; Elevated liver enzymes. Epigastric pain, n/v,   constipation    TECHNIQUE:    Axial computed tomography images of the abdomen and pelvis with intravenous   contrast.  All CT scans at this facility use at least one of these dose   optimization techniques: automated exposure control; mA and/or kV adjustment   per patient size (includes targeted exams where dose is matched to clinical   indication); or iterative reconstruction.    Coronal and sagittal  reformatted images were created and reviewed.    CONTRAST:    100 mL of iso 300 administered intravenously.      COMPARISON:    US GALLBLADDER 10/5/2018 1:03 PM    FINDINGS:    There is moderate to severe diffuse intra-and extrahepatic biliary   dilatation, the common bile duct measuring up to 1.9 cm. the gallbladder is   distended, measuring up to 11 cm.  No pericholecystic inflammation.  No   visualized gallstone or biliary stone.      The common bile duct tapers abruptly within the pancreatic head.  However,   there is no visualized pancreatic lesion.  There is no acute pancreatitis or   pancreatic ductal dilatation.      The lung bases, liver, spleen, adrenal glands, kidneys, uterus, ovaries, and   urinary bladder are normal.      There is no bowel inflammation, obstruction, free intraperitoneal air, or   ascites.  The appendix is normal.      No acute osseous abnormality.      Impression:         Diffuse dilatation of the biliary tree and gallbladder without visualized   stone or pericholecystic inflammation.      Abrupt tapering of the common bile duct within the pancreatic head without   visualized pancreatic lesion.  No pancreatic ductal dilatation.  Findings could   reflect a stricture or a nonvisualized pancreatic lesion.  GI evaluation   recommended.    THIS DOCUMENT HAS BEEN ELECTRONICALLY SIGNED BY TOBY FORTUNE MD           Assessment/Plan   Assessment / Plan     Hospital Problem List     * (Principal)Intractable abdominal pain    Elevated liver enzymes    Hyperbilirubinemia    Cholelithiasis    Jaundice        Assessment & Plan:  1. Intractable abdominal pain:  - with elevated liver enzymes and gallstones demonstrated on imaging. Suspect obstruction. There are some CBD abnormalities.  - will obtain MRCP and consult GI  - consider general surgery consult in am  - avoid hepatotoxins  - NPO for now  - analgesia and antiemetics as needed    DVT prophylaxis: mechanical    CODE STATUS:  Full code, full  support  Code Status and Medical Interventions:   Ordered at: 10/07/18 0238     Level Of Support Discussed With:    Patient     Code Status:    CPR     Medical Interventions (Level of Support Prior to Arrest):    Full     Admission Status:  I believe this patient meets INPATIENT status due to the need for care which can only be reasonably provided in an hospital setting such as aggressive/expedited ancillary services and/or consultation services, the necessity for IV medications, close physician monitoring and/or the possible need for procedures.  In such, I feel patient’s risk for adverse outcomes and need for care warrant INPATIENT evaluation and predict the patient’s care encounter to likely last beyond 2 midnights.    Electronically signed by Mono Torres PA-C, 10/07/18, 2:37 AM.    Brief Attending Admission Attestation     I have seen and examined the patient, performing an independent face-to-face diagnostic evaluation with plan of care reviewed and developed with the advanced practice clinician (APC).      Brief Summary Statement/HPI:   Piyush Wren is a 23 y.o. female with history of PCOS and depression who started having symptoms on Monday that included feeling hot and cold with progressive RUQ pain that progressed until Thursday when she had to present at an Urgent Treatment Clinic and she had Ultrasound of her gallbladder done which showed no cholecystitis.  She started having Nausea and Vomiting and presented to the ER today unimproved.  She has jaundice with a bilirubin above 4 and progressive right upper quadrant pain and concern for obstructive jaundice either due to choledocholithiasis vs pancreatic abnormality.    Attending Physical Exam:    Constitutional: No acute distress, awake, alert  Eyes: PERRLA, sclerae icteric, no conjunctival injection  HENT: NCAT, mucous membranes moist  Neck: Supple, no JVD, trachea midline  Respiratory: Clear to auscultation bilaterally, nonlabored respirations    Cardiovascular: RRR, no murmurs, rubs, or gallops, palpable pedal pulses bilaterally  Gastrointestinal: Positive bowel sounds, soft, obese abdomen, Right upper quadrant pain on palpation  Musculoskeletal: No bilateral ankle edema, no clubbing or cyanosis to extremities  Psychiatric: Appropriate affect, cooperative  Neurologic: Oriented x 3, strength symmetric in all extremities, Cranial Nerves grossly intact to confrontation, speech clear  Skin: No rashes    Brief Assessment/Plan :  See above for further detailed assessment and plan developed with APC which I have reviewed and/or edited.      Electronically signed by Oliverio Reinoso MD, 10/07/18, 3:53 AM.

## 2018-10-07 NOTE — CONSULTS
General Surgery Consultation Note    Date of Service: 10/7/2018  Piyush Wren  [id number]  1995      Referring Provider: Felicita Mccall DO    Location of Consult: Hospital floor     Reason for Consultation: Cholelithiasis       History of Present Illness:  I am seeing, Piyush Wren, in consultation for Felicita Mccall DO regarding cholelithiasis.  23-year-old young lady was admitted to the hospital through the emergency room with right upper quadrant abdominal pain.  Her pain has been ongoing for approximately 1 week and is worse with greasy fatty type foods.  She has had associated nausea and vomiting.  Her pain is colicky.  Her workup with the right upper quadrant ultrasound, MRCP, and CT scan demonstrated choledocholithiasis and cholelithiasis.  She was taken to the operating room by Dr. Brunner with gastroenterology for ERCP today.  He was unable to retrieve the large common bile duct stone and stented the common bile duct.     History reviewed. No pertinent past medical history.    Allergies   Allergen Reactions   • Penicillins Rash       No current facility-administered medications on file prior to encounter.      No current outpatient prescriptions on file prior to encounter.         Current Facility-Administered Medications:   •  benzocaine-menthol (CEPACOL) lozenge 1 lozenge, 1 lozenge, Mouth/Throat, Q2H PRN, Brunner, Mark I, MD  •  HYDROmorphone (DILAUDID) injection 0.25 mg, 0.25 mg, Intravenous, Q4H PRN, Brunner, Mark I, MD, 0.25 mg at 10/07/18 1445  •  ondansetron (ZOFRAN) injection 4 mg, 4 mg, Intravenous, Q6H PRN, Mono Torres PA-C  •  sodium chloride 0.9 % flush 10 mL, 10 mL, Intravenous, PRN, Talya Sheldon MD  •  sodium chloride 0.9 % flush 3 mL, 3 mL, Intravenous, Q12H, Mono Torres PA-C  •  sodium chloride 0.9 % flush 3-10 mL, 3-10 mL, Intravenous, PRN, Mono Torres PA-C  •  sodium chloride 0.9 % infusion, 75 mL/hr, Intravenous, Continuous, Cal  Felicita KENNEY DO, Last Rate: 75 mL/hr at 10/07/18 1428, 75 mL/hr at 10/07/18 1428    Past Surgical History:   • PILONIDAL CYST DRAINAGE       Family History   Problem Relation Age of Onset   • COPD Mother    • Hyperlipidemia Father    • Heart disease Father      Social History     Social History   • Marital status: Single     Spouse name: N/A   • Number of children: N/A   • Years of education: N/A     Occupational History   • Not on file.     Social History Main Topics   • Smoking status: Never Smoker   • Smokeless tobacco: Not on file   • Alcohol use No   • Drug use: No   • Sexual activity: Defer     Other Topics Concern   • Not on file     Social History Narrative   • No narrative on file       Review of Systems:  Review of Systems   Constitutional: Positive for appetite change. Negative for fever.   HENT: Negative for ear discharge, nosebleeds, sneezing and trouble swallowing.    Eyes: Negative for photophobia and visual disturbance.   Respiratory: Negative for cough, chest tightness, shortness of breath and wheezing.    Cardiovascular: Negative for chest pain, palpitations and leg swelling.   Gastrointestinal: Positive for abdominal pain, nausea and vomiting. Negative for blood in stool.   Endocrine: Negative for polyphagia and polyuria.   Genitourinary: Negative for decreased urine volume, difficulty urinating, dysuria, flank pain and urgency.   Musculoskeletal: Negative for arthralgias, joint swelling and myalgias.   Skin: Negative for pallor and rash.   Allergic/Immunologic: Negative for immunocompromised state.   Neurological: Negative for syncope, numbness and headaches.   Hematological: Negative for adenopathy.   Psychiatric/Behavioral: Negative for behavioral problems, decreased concentration and sleep disturbance. The patient is not hyperactive.      Otherwise the 12 point review of systems is negative.    /77 (BP Location: Right arm, Patient Position: Lying)   Pulse 65   Temp 97.7 °F (36.5 °C)  "(Oral)   Resp 16   Ht 170.2 cm (67\")   Wt 103 kg (226 lb 1.6 oz)   LMP 09/27/2018 Comment: neg hcg 10/6/2018  SpO2 98%   BMI 35.41 kg/m²   Body mass index is 35.41 kg/m².    General: Pleasantly conversant  HEENT: PER, icteric sclera  Cardiac: regular rhythm,  no audible rubs  Pulmonary: bilateral breath sounds, non labored  Abdominal: Soft, minimal tenderness, no palpable hepatosplenomegaly  Neurologic: awake, alert, no obvious focal deficits  Extremities: warm, no edema  Skin: no obvious rashes nor worrisome lesions seen     CBC    Results from last 7 days  Lab Units 10/07/18  0609   WBC 10*3/mm3 6.03   HEMOGLOBIN g/dL 11.9   HEMATOCRIT % 37.5   PLATELETS 10*3/mm3 312       CMP    Results from last 7 days  Lab Units 10/07/18  0609   SODIUM mmol/L 137   POTASSIUM mmol/L 4.0   CHLORIDE mmol/L 104   CO2 mmol/L 26.0   BUN mg/dL <5*   CREATININE mg/dL 0.65   CALCIUM mg/dL 8.7   BILIRUBIN mg/dL 4.6*   ALK PHOS U/L 185*   ALT (SGPT) U/L 269*   AST (SGOT) U/L 128*   GLUCOSE mg/dL 96       Radiology  Imaging Results (last 72 hours)     Procedure Component Value Units Date/Time    MRI abdomen wo contrast mrcp [[id number]] Collected:  10/07/18 1149     Updated:  10/07/18 1450    Narrative:       EXAMINATION: MRI ABDOMEN WO CONTRAST MRCP - 10/7/2018     INDICATION: R74.8-Abnormal levels of other serum enzymes.     TECHNIQUE:  Noncontrast MRI of the abdomen was performed utilizing MRCP  protocol.     COMPARISONS:  CT abdomen/pelvis 4 hours prior.     FINDINGS:  Marked intrahepatic and extrahepatic biliary ductal  dilatation is seen with a stone in the mid common bile duct. There are  stones in the distended gallbladder. No pericholecystic inflammatory  change. There is normal background signal without steatosis or  cirrhosis. No focal lesion is seen on the T2-weighted series. Pancreas  is normal without evidence of ductal dilatation or divisum. The adrenal  glands, kidneys and spleen are unremarkable. No dilated small or " large  bowel is seen in the imaged field of view. No enlarged lymph nodes or  free fluid is seen in the image field of view.       Impression:       1. Obstructing calculus in the common bile duct causing intrahepatic and  extrahepatic biliary ductal dilatation.  2. Cholelithiasis without evidence of acute cholecystitis.     DICTATED:   10/07/2018  EDITED/ls :   10/07/2018      This report was finalized on 10/7/2018 2:48 PM by Preet Luis.       FL ERCP pancreatic and biliary ducts [[id number]] Updated:  10/07/18 1315    CT Abdomen Pelvis With Contrast [[id number]] Collected:  10/06/18 2327     Updated:  10/07/18 0132    Narrative:       EXAM:    CT Abdomen and Pelvis With Intravenous Contrast    EXAM DATE/TIME:    10/6/2018 11:27 PM    CLINICAL HISTORY:    23 years old, female; Elevated liver enzymes. Epigastric pain, n/v,   constipation    TECHNIQUE:    Axial computed tomography images of the abdomen and pelvis with intravenous   contrast.  All CT scans at this facility use at least one of these dose   optimization techniques: automated exposure control; mA and/or kV adjustment   per patient size (includes targeted exams where dose is matched to clinical   indication); or iterative reconstruction.    Coronal and sagittal reformatted images were created and reviewed.    CONTRAST:    100 mL of iso 300 administered intravenously.      COMPARISON:    US GALLBLADDER 10/5/2018 1:03 PM    FINDINGS:    There is moderate to severe diffuse intra-and extrahepatic biliary   dilatation, the common bile duct measuring up to 1.9 cm. the gallbladder is   distended, measuring up to 11 cm.  No pericholecystic inflammation.  No   visualized gallstone or biliary stone.      The common bile duct tapers abruptly within the pancreatic head.  However,   there is no visualized pancreatic lesion.  There is no acute pancreatitis or   pancreatic ductal dilatation.      The lung bases, liver, spleen, adrenal glands, kidneys, uterus, ovaries,  and   urinary bladder are normal.      There is no bowel inflammation, obstruction, free intraperitoneal air, or   ascites.  The appendix is normal.      No acute osseous abnormality.      Impression:         Diffuse dilatation of the biliary tree and gallbladder without visualized   stone or pericholecystic inflammation.      Abrupt tapering of the common bile duct within the pancreatic head without   visualized pancreatic lesion.  No pancreatic ductal dilatation.  Findings could   reflect a stricture or a nonvisualized pancreatic lesion.  GI evaluation   recommended.    THIS DOCUMENT HAS BEEN ELECTRONICALLY SIGNED BY TOBY FORTUNE MD            Assessment:  Choledocholithiasis with cholelithiasis    Plan:  Gastroenterology was unable to retrieve the common bile duct stone.  Her common bile duct was stented to help alleviate her symptoms.  The cystic duct on ERCP is patent.  On imaging there is no evidence of acute cholecystitis.  I discussed the case directly with Dr. Brunner.  He is going to reattempt her ERCP in a few weeks to retrieve the common bile duct stone.  My preference is to wait until common bile duct clearance for cholecystectomy, if gastroenterology is unable to clear her common bile duct I can do so through a common bile duct exploration.  I discussed this with the patient and her family.  All of their questions were answered. She may follow-up in the office as an outpatient once her common bile duct has been cleared.  Thank you very much for consultation.    Derek Escalante MD  10/07/18  2:57 PM

## 2018-10-08 LAB
ALBUMIN SERPL-MCNC: 3.47 G/DL (ref 3.2–4.8)
ALBUMIN/GLOB SERPL: 1.6 G/DL (ref 1.5–2.5)
ALP SERPL-CCNC: 174 U/L (ref 25–100)
ALT SERPL W P-5'-P-CCNC: 246 U/L (ref 7–40)
ANION GAP SERPL CALCULATED.3IONS-SCNC: 9 MMOL/L (ref 3–11)
AST SERPL-CCNC: 155 U/L (ref 0–33)
BILIRUB SERPL-MCNC: 1.9 MG/DL (ref 0.3–1.2)
BUN BLD-MCNC: 6 MG/DL (ref 9–23)
BUN/CREAT SERPL: 9.2 (ref 7–25)
CALCIUM SPEC-SCNC: 8.7 MG/DL (ref 8.7–10.4)
CHLORIDE SERPL-SCNC: 102 MMOL/L (ref 99–109)
CO2 SERPL-SCNC: 25 MMOL/L (ref 20–31)
CREAT BLD-MCNC: 0.65 MG/DL (ref 0.6–1.3)
DEPRECATED RDW RBC AUTO: 43 FL (ref 37–54)
ERYTHROCYTE [DISTWIDTH] IN BLOOD BY AUTOMATED COUNT: 13.6 % (ref 11.3–14.5)
GFR SERPL CREATININE-BSD FRML MDRD: 114 ML/MIN/1.73
GLOBULIN UR ELPH-MCNC: 2.1 GM/DL
GLUCOSE BLD-MCNC: 80 MG/DL (ref 70–100)
HCT VFR BLD AUTO: 37.3 % (ref 34.5–44)
HGB BLD-MCNC: 11.9 G/DL (ref 11.5–15.5)
MCH RBC QN AUTO: 27.5 PG (ref 27–31)
MCHC RBC AUTO-ENTMCNC: 31.9 G/DL (ref 32–36)
MCV RBC AUTO: 86.3 FL (ref 80–99)
PLATELET # BLD AUTO: 304 10*3/MM3 (ref 150–450)
PMV BLD AUTO: 10 FL (ref 6–12)
POTASSIUM BLD-SCNC: 4 MMOL/L (ref 3.5–5.5)
PROT SERPL-MCNC: 5.6 G/DL (ref 5.7–8.2)
RBC # BLD AUTO: 4.32 10*6/MM3 (ref 3.89–5.14)
SODIUM BLD-SCNC: 136 MMOL/L (ref 132–146)
WBC NRBC COR # BLD: 11.4 10*3/MM3 (ref 3.5–10.8)

## 2018-10-08 PROCEDURE — 85027 COMPLETE CBC AUTOMATED: CPT | Performed by: FAMILY MEDICINE

## 2018-10-08 PROCEDURE — 99233 SBSQ HOSP IP/OBS HIGH 50: CPT | Performed by: INTERNAL MEDICINE

## 2018-10-08 PROCEDURE — 99232 SBSQ HOSP IP/OBS MODERATE 35: CPT | Performed by: PHYSICIAN ASSISTANT

## 2018-10-08 PROCEDURE — 25010000002 HYDROMORPHONE PER 4 MG: Performed by: INTERNAL MEDICINE

## 2018-10-08 PROCEDURE — 25010000002 ONDANSETRON PER 1 MG: Performed by: PHYSICIAN ASSISTANT

## 2018-10-08 PROCEDURE — 25010000002 PROMETHAZINE PER 50 MG: Performed by: INTERNAL MEDICINE

## 2018-10-08 PROCEDURE — 80053 COMPREHEN METABOLIC PANEL: CPT | Performed by: FAMILY MEDICINE

## 2018-10-08 RX ORDER — PROMETHAZINE HYDROCHLORIDE 12.5 MG/1
12.5 TABLET ORAL EVERY 6 HOURS PRN
Status: DISCONTINUED | OUTPATIENT
Start: 2018-10-08 | End: 2018-10-14 | Stop reason: HOSPADM

## 2018-10-08 RX ORDER — BISACODYL 10 MG
10 SUPPOSITORY, RECTAL RECTAL DAILY
Status: DISCONTINUED | OUTPATIENT
Start: 2018-10-08 | End: 2018-10-14 | Stop reason: HOSPADM

## 2018-10-08 RX ORDER — HYDROMORPHONE HYDROCHLORIDE 1 MG/ML
0.5 INJECTION, SOLUTION INTRAMUSCULAR; INTRAVENOUS; SUBCUTANEOUS ONCE
Status: COMPLETED | OUTPATIENT
Start: 2018-10-08 | End: 2018-10-08

## 2018-10-08 RX ORDER — PROMETHAZINE HYDROCHLORIDE 25 MG/ML
12.5 INJECTION, SOLUTION INTRAMUSCULAR; INTRAVENOUS EVERY 6 HOURS PRN
Status: DISCONTINUED | OUTPATIENT
Start: 2018-10-08 | End: 2018-10-14 | Stop reason: HOSPADM

## 2018-10-08 RX ORDER — PANTOPRAZOLE SODIUM 40 MG/10ML
40 INJECTION, POWDER, LYOPHILIZED, FOR SOLUTION INTRAVENOUS
Status: DISCONTINUED | OUTPATIENT
Start: 2018-10-08 | End: 2018-10-13

## 2018-10-08 RX ADMIN — HYDROMORPHONE HYDROCHLORIDE 0.25 MG: 1 INJECTION, SOLUTION INTRAMUSCULAR; INTRAVENOUS; SUBCUTANEOUS at 19:53

## 2018-10-08 RX ADMIN — HYDROMORPHONE HYDROCHLORIDE 0.25 MG: 1 INJECTION, SOLUTION INTRAMUSCULAR; INTRAVENOUS; SUBCUTANEOUS at 06:04

## 2018-10-08 RX ADMIN — BISACODYL 10 MG: 10 SUPPOSITORY RECTAL at 13:19

## 2018-10-08 RX ADMIN — Medication 3 ML: at 09:32

## 2018-10-08 RX ADMIN — HYDROMORPHONE HYDROCHLORIDE 0.25 MG: 1 INJECTION, SOLUTION INTRAMUSCULAR; INTRAVENOUS; SUBCUTANEOUS at 09:50

## 2018-10-08 RX ADMIN — PROMETHAZINE HYDROCHLORIDE 12.5 MG: 25 INJECTION, SOLUTION INTRAMUSCULAR; INTRAVENOUS at 19:53

## 2018-10-08 RX ADMIN — PROMETHAZINE HYDROCHLORIDE 12.5 MG: 25 INJECTION, SOLUTION INTRAMUSCULAR; INTRAVENOUS at 07:38

## 2018-10-08 RX ADMIN — HYDROMORPHONE HYDROCHLORIDE 0.25 MG: 1 INJECTION, SOLUTION INTRAMUSCULAR; INTRAVENOUS; SUBCUTANEOUS at 02:01

## 2018-10-08 RX ADMIN — HYDROMORPHONE HYDROCHLORIDE 0.5 MG: 1 INJECTION, SOLUTION INTRAMUSCULAR; INTRAVENOUS; SUBCUTANEOUS at 04:10

## 2018-10-08 RX ADMIN — ONDANSETRON 4 MG: 2 INJECTION, SOLUTION INTRAMUSCULAR; INTRAVENOUS at 03:51

## 2018-10-08 RX ADMIN — HYDROMORPHONE HYDROCHLORIDE 0.25 MG: 1 INJECTION, SOLUTION INTRAMUSCULAR; INTRAVENOUS; SUBCUTANEOUS at 13:19

## 2018-10-08 RX ADMIN — PANTOPRAZOLE SODIUM 40 MG: 40 INJECTION, POWDER, FOR SOLUTION INTRAVENOUS at 04:09

## 2018-10-08 NOTE — PLAN OF CARE
Problem: Patient Care Overview  Goal: Plan of Care Review  Outcome: Ongoing (interventions implemented as appropriate)   10/08/18 9381   OTHER   Outcome Summary Patient still having pain in abdominal area. Nausea better with prn meds.      Goal: Individualization and Mutuality  Outcome: Ongoing (interventions implemented as appropriate)    Goal: Discharge Needs Assessment  Outcome: Ongoing (interventions implemented as appropriate)    Goal: Interprofessional Rounds/Family Conf  Outcome: Ongoing (interventions implemented as appropriate)      Problem: Pain, Acute (Adult)  Goal: Acceptable Pain Control/Comfort Level  Outcome: Ongoing (interventions implemented as appropriate)

## 2018-10-08 NOTE — PROGRESS NOTES
Discharge Planning Assessment  [facility]     Patient Name: Piyush Wren  MRN: [id number]  Today's Date: 10/8/2018    Admit Date: 10/6/2018          Discharge Needs Assessment     Row Name 10/08/18 1318       Living Environment    Lives With parent(s)    Current Living Arrangements home/apartment/condo    Primary Care Provided by parent(s)    Provides Primary Care For no one    Family Caregiver if Needed parent(s)    Quality of Family Relationships helpful;involved;supportive    Able to Return to Prior Arrangements yes       Resource/Environmental Concerns    Resource/Environmental Concerns none    Transportation Concerns car, none       Transition Planning    Patient/Family Anticipates Transition to home with family    Patient/Family Anticipated Services at Transition none    Transportation Anticipated family or friend will provide       Discharge Needs Assessment    Readmission Within the Last 30 Days no previous admission in last 30 days    Concerns to be Addressed discharge planning    Equipment Currently Used at Home none    Anticipated Changes Related to Illness none    Equipment Needed After Discharge none            Discharge Plan     Row Name 10/08/18 1319       Plan    Plan IDP    Patient/Family in Agreement with Plan yes    Plan Comments Pt lives w her parents in [place] she is a full time college student. her mother will be taking her home at d/c. Pt uses no DME, HH, or PT/OT. Pt did not have a reg PCP as her college is in [place] and that is where she has been seeing PCP. Since she is going home w her Mom in [place] a PCP has been arranged via [facility]. Pt appt is on Thurs Oct 18 at 2:30pm w Allyn He. Pt ins covers her medications. Pt goal is to return home w her mother at d/c. CM will continue to follow.        Destination     No service coordination in this encounter.      Durable Medical Equipment     No service coordination in this encounter.      Dialysis/Infusion     No service  coordination in this encounter.      Home Medical Care     No service coordination in this encounter.      Social Care     No service coordination in this encounter.                Demographic Summary     Row Name 10/08/18 1317       General Information    Admission Type inpatient    Arrived From emergency department;home    Referral Source admission list    Reason for Consult discharge planning    Preferred Language English       Contact Information    Permission Granted to Share Info With ;family/designee    Contact Information Comments Bang (mother) [phone]            Functional Status     Row Name 10/08/18 1318       Functional Status    Usual Activity Tolerance good       Functional Status, IADL    Medications independent    Meal Preparation independent    Housekeeping independent    Laundry independent    Shopping independent       Employment/    Employment Status student            Psychosocial    No documentation.           Abuse/Neglect    No documentation.           Legal    No documentation.           Substance Abuse    No documentation.           Patient Forms    No documentation.         Felicita Ayala RN

## 2018-10-08 NOTE — PROGRESS NOTES
"GI Daily Progress Note  Subjective:    Chief Complaint: Abdominal pain     Had worsening abdominal pain, nausea and vomiting in the night.  Began at 0200.   Pain initially described as epigastric but now lower abdominal.   Does not like the regular diet.      Objective:    /69   Pulse 76   Temp 98.5 °F (36.9 °C)   Resp 16   Ht 170.2 cm (67\")   Wt 103 kg (226 lb 1.6 oz)   LMP 09/27/2018 Comment: neg hcg 10/6/2018  SpO2 98%   BMI 35.41 kg/m²     Physical Exam   Constitutional: She is oriented to person, place, and time. She appears well-developed.   Cardiovascular: Normal rate and regular rhythm.    Pulmonary/Chest: Effort normal. No respiratory distress.   Abdominal: Soft. Bowel sounds are normal. She exhibits no distension. There is no tenderness.   Neurological: She is alert and oriented to person, place, and time.   Skin: Skin is warm and dry.   Psychiatric: Her behavior is normal.   Nursing note and vitals reviewed.      Lab  Lab Results   Component Value Date    WBC 11.40 (H) 10/08/2018    HGB 11.9 10/08/2018    HGB 11.9 10/07/2018    HGB 13.2 10/06/2018    MCV 86.3 10/08/2018     10/08/2018       Lab Results   Component Value Date    GLUCOSE 80 10/08/2018    BUN 6 (L) 10/08/2018    CREATININE 0.65 10/08/2018    EGFRIFNONA 114 10/08/2018    BCR 9.2 10/08/2018    CO2 25.0 10/08/2018    CALCIUM 8.7 10/08/2018    ALBUMIN 3.47 10/08/2018    ALKPHOS 174 (H) 10/08/2018    BILITOT 1.9 (H) 10/08/2018    BILIDIR 2.8 (H) 10/05/2018     (H) 10/08/2018     (H) 10/08/2018       Assessment:    Choledocholithiasis, s/p ERCP showing large obstructing stone unable to be removed.  Temporary stent placed  Cholestatic hepatitis, secondary to obstruction, improved  Abdominal pain  N/V post ERCP  Constipation    Plan:    >>> Possible post ERCP pancreatitis vs constipation.  Last bowel movement was 5 days ago.  Favor constipation as etiology given waxing/waning nature and location.  >>> Will increase " NS to 150 ml/hr  >>> Change diet to clear liquids  >>> Dulcolax suppository    VÍCTOR Newberry  10/08/18  12:21 PM

## 2018-10-08 NOTE — PAYOR COMM NOTE
"Carley José RN Utilization Review [phone]  Fax # [phone]  Ref # [id number]          Piyush Wren (23 y.o. Female)     Date of Birth Social Security Number Address Home Phone MRN    1995  [address]  [place] [address] [phone] [id number]    [facility] Marital Status          None Single       Admission Date Admission Type Admitting Provider Attending Provider Department, Room/Bed    10/6/18 Emergency Sivan Montoya MD Howard, Gabriela Kirk, MD [facility] 5B, N540/1    Discharge Date Discharge Disposition Discharge Destination                       Attending Provider:  Sivan Montoya MD    Allergies:  Penicillins    Isolation:  None   Infection:  None   Code Status:  CPR    Ht:  170.2 cm (67\")   Wt:  103 kg (226 lb 1.6 oz)    Admission Cmt:  None   Principal Problem:  Choledocholithiasis [K80.50]                 Active Insurance as of 10/6/2018     Primary Coverage     Payor Plan Insurance Group Employer/Plan Group    CIG[employer] CIGNA [id number]     Payor Plan Address Payor Plan Phone Number Effective From Effective To    [address] [phone] 2018     [facility] [id number]       Subscriber Name Subscriber Birth Date Member ID       PIYUSH WREN 1995 [id number]                 Emergency Contacts      (Rel.) Home Phone Work Phone Mobile Phone    Bang Wren (Mother) [phone] -- --    Gary Granados (Relative) -- -- [phone]               History & Physical      Oliverio Reinoso MD at 10/7/2018  2:36 AM              [facility]  HISTORY AND PHYSICAL    Patient Name: Piyush Wren  : 1995  MRN: [id number]  Primary Care Physician: Provider, No Known - Selina Otoole NP - Soraida Go  Date of admission: 10/6/2018      Subjective   Subjective     Chief Complaint:  RUQ pain    HPI:  Piyush Wren is a 23 y.o. female with no significant past medical history " who presents with complaints of RUQ abdominal pain progressively worsening over the past week. Pain is constant, sharp, and radiates to lower abdomen and upper back. Seems worse with PO intake and movement. There is associated nausea with vomiting X1 episode, generalized weakness and constipation. Patient was initially evaluated yesterday at [facility] where she underwent US gallbladder and laboratory studies. States she was told she had elevated liver enzymes and gallstones. She called to alert her PCP and subsequently directed to  ED for further evaluation and treatment. She has no complaints of cough, congestion, fever, abdominal vaginal bleeding/discharge, dysuria, or hematuria. No other complaints at this time. She has no history abdominal surgeries. No sick contacts. Has never been pregnant and denies change of current pregnancy. Will admit to for further evaluation and treatment.    Emergency Department Evaluation; vitals stable. Alk phos 200. . . Chemistry and hematology otherwise favorable. Ct abdomen demonstrates Diffuse dilatation of the biliary tree and gallbladder without visualized stone or pericholecystic inflammation. US notes chololithiasis with acute cholecystitis. HCG negative.    Review of Systems   Constitutional: Positive for appetite change and chills. Negative for fever.   HENT: Negative for congestion and trouble swallowing.    Eyes: Negative for photophobia and visual disturbance.   Respiratory: Negative for cough and shortness of breath.    Cardiovascular: Negative for chest pain and leg swelling.   Gastrointestinal: Positive for abdominal pain, constipation and vomiting. Negative for diarrhea and nausea.   Endocrine: Negative for cold intolerance and heat intolerance.   Genitourinary: Negative for dysuria and flank pain.   Musculoskeletal: Negative for back pain.   Skin: Negative for pallor and rash.   Allergic/Immunologic: Negative for immunocompromised state.   Neurological:  Negative for dizziness, weakness and light-headedness.   Hematological: Negative for adenopathy.   Psychiatric/Behavioral: Negative for agitation and confusion.          Otherwise 10-system ROS reviewed and is negative except as mentioned in the HPI.    Personal History     History reviewed. No pertinent past medical history.  PCOS  Depression    History reviewed. No pertinent surgical history.  Pilonidal Cyst removal    Family History: family history is not on file.   Father - HTN  Mother - Thyroid      Social History:  reports that she has never smoked. She does not have any smokeless tobacco history on file. She reports that she does not drink alcohol or use drugs.  Social History     Social History Narrative   • No narrative on file   Single  No kids  Student  Denies smoking  Denies drug abuse  Denies alcohol abuse    Medications:  Available home medication information reviewed     Allergies   Allergen Reactions   • Penicillins Rash       Objective   Objective     Vital Signs:   Temp:  [98.2 °F (36.8 °C)] 98.2 °F (36.8 °C)  Heart Rate:  [97-98] 97  Resp:  [16] 16  BP: (114-138)/(70-86) 114/70      Physical Exam   Constitutional: No acute distress, awake, alert  Eyes: PERRLA, sclerae anicteric, no conjunctival injection  HENT: NCAT, mucous membranes moist  Neck: Supple, no thyromegaly, no lymphadenopathy, trachea midline  Respiratory: Clear to auscultation bilaterally, nonlabored respirations   Cardiovascular: RRR, no murmurs, rubs, or gallops, palpable pedal pulses bilaterally  Gastrointestinal: Positive bowel sounds, soft, nondistended there is some mild tenderness to palpation  Musculoskeletal: No bilateral ankle edema, no clubbing or cyanosis to extremities  Psychiatric: Appropriate affect, cooperative  Neurologic: Oriented x 3, strength symmetric in all extremities, Cranial Nerves grossly intact to confrontation, speech clear  Skin: No rashes    Results Reviewed:  I have personally reviewed current lab,  radiology, and data and agree.      Results from last 7 days  Lab Units 10/06/18  2356   WBC 10*3/mm3 8.17   HEMOGLOBIN g/dL 13.2   HEMATOCRIT % 40.0   PLATELETS 10*3/mm3 328       Results from last 7 days  Lab Units 10/06/18  2229   SODIUM mmol/L 137   POTASSIUM mmol/L 3.7   CHLORIDE mmol/L 106   CO2 mmol/L 22.0   BUN mg/dL <5*   CREATININE mg/dL 0.69   GLUCOSE mg/dL 99   CALCIUM mg/dL 9.5   ALT (SGPT) U/L 316*   AST (SGOT) U/L 172*     Estimated Creatinine Clearance: 157.9 mL/min (by C-G formula based on SCr of 0.69 mg/dL).  Brief Urine Lab Results  (Last result in the past 365 days)      Color   Clarity   Blood   Leuk Est   Nitrite   Protein   CREAT   Urine HCG        10/06/18 2323               Negative         No results found for: BNP  Imaging Results (last 24 hours)     Procedure Component Value Units Date/Time    CT Abdomen Pelvis With Contrast [[id number]] Collected:  10/06/18 2327     Updated:  10/07/18 0132    Narrative:       EXAM:    CT Abdomen and Pelvis With Intravenous Contrast    EXAM DATE/TIME:    10/6/2018 11:27 PM    CLINICAL HISTORY:    23 years old, female; Elevated liver enzymes. Epigastric pain, n/v,   constipation    TECHNIQUE:    Axial computed tomography images of the abdomen and pelvis with intravenous   contrast.  All CT scans at this facility use at least one of these dose   optimization techniques: automated exposure control; mA and/or kV adjustment   per patient size (includes targeted exams where dose is matched to clinical   indication); or iterative reconstruction.    Coronal and sagittal reformatted images were created and reviewed.    CONTRAST:    100 mL of iso 300 administered intravenously.      COMPARISON:    US GALLBLADDER 10/5/2018 1:03 PM    FINDINGS:    There is moderate to severe diffuse intra-and extrahepatic biliary   dilatation, the common bile duct measuring up to 1.9 cm. the gallbladder is   distended, measuring up to 11 cm.  No pericholecystic inflammation.  No    visualized gallstone or biliary stone.      The common bile duct tapers abruptly within the pancreatic head.  However,   there is no visualized pancreatic lesion.  There is no acute pancreatitis or   pancreatic ductal dilatation.      The lung bases, liver, spleen, adrenal glands, kidneys, uterus, ovaries, and   urinary bladder are normal.      There is no bowel inflammation, obstruction, free intraperitoneal air, or   ascites.  The appendix is normal.      No acute osseous abnormality.      Impression:         Diffuse dilatation of the biliary tree and gallbladder without visualized   stone or pericholecystic inflammation.      Abrupt tapering of the common bile duct within the pancreatic head without   visualized pancreatic lesion.  No pancreatic ductal dilatation.  Findings could   reflect a stricture or a nonvisualized pancreatic lesion.  GI evaluation   recommended.    THIS DOCUMENT HAS BEEN ELECTRONICALLY SIGNED BY TOBY FORTUNE MD           Assessment/Plan   Assessment / Plan     Hospital Problem List     * (Principal)Intractable abdominal pain    Elevated liver enzymes    Hyperbilirubinemia    Cholelithiasis    Jaundice        Assessment & Plan:  1. Intractable abdominal pain:  - with elevated liver enzymes and gallstones demonstrated on imaging. Suspect obstruction. There are some CBD abnormalities.  - will obtain MRCP and consult GI  - consider general surgery consult in am  - avoid hepatotoxins  - NPO for now  - analgesia and antiemetics as needed    DVT prophylaxis: mechanical    CODE STATUS:  Full code, full support  Code Status and Medical Interventions:   Ordered at: 10/07/18 0238     Level Of Support Discussed With:    Patient     Code Status:    CPR     Medical Interventions (Level of Support Prior to Arrest):    Full     Admission Status:  I believe this patient meets INPATIENT status due to the need for care which can only be reasonably provided in an hospital setting such as aggressive/expedited  ancillary services and/or consultation services, the necessity for IV medications, close physician monitoring and/or the possible need for procedures.  In such, I feel patient’s risk for adverse outcomes and need for care warrant INPATIENT evaluation and predict the patient’s care encounter to likely last beyond 2 midnights.    Electronically signed by Mono Torres PA-C, 10/07/18, 2:37 AM.    Brief Attending Admission Attestation     I have seen and examined the patient, performing an independent face-to-face diagnostic evaluation with plan of care reviewed and developed with the advanced practice clinician (APC).      Brief Summary Statement/HPI:   Piyush Wren is a 23 y.o. female with history of PCOS and depression who started having symptoms on Monday that included feeling hot and cold with progressive RUQ pain that progressed until Thursday when she had to present at an [facility]gent [facility] Clinic and she had Ultrasound of her gallbladder done which showed no cholecystitis.  She started having Nausea and Vomiting and presented to the ER today unimproved.  She has jaundice with a bilirubin above 4 and progressive right upper quadrant pain and concern for obstructive jaundice either due to choledocholithiasis vs pancreatic abnormality.    Attending Physical Exam:    Constitutional: No acute distress, awake, alert  Eyes: PERRLA, sclerae icteric, no conjunctival injection  HENT: NCAT, mucous membranes moist  Neck: Supple, no JVD, trachea midline  Respiratory: Clear to auscultation bilaterally, nonlabored respirations   Cardiovascular: RRR, no murmurs, rubs, or gallops, palpable pedal pulses bilaterally  Gastrointestinal: Positive bowel sounds, soft, obese abdomen, Right upper quadrant pain on palpation  Musculoskeletal: No bilateral ankle edema, no clubbing or cyanosis to extremities  Psychiatric: Appropriate affect, cooperative  Neurologic: Oriented x 3, strength symmetric in all extremities, Cranial Nerves  "grossly intact to confrontation, speech clear  Skin: No rashes    Brief Assessment/Plan :  See above for further detailed assessment and plan developed with APC which I have reviewed and/or edited.      Electronically signed by Oliverio Reinoso MD, 10/07/18, 3:53 AM.             Electronically signed by Oliverio Reinoso MD at 10/7/2018  4:02 AM          Emergency Department Notes      Carrol Pang APRN at 10/6/2018 11:16 PM     Attestation signed by Talya Sheldon MD at 10/7/2018  7:27 AM          For this patient encounter, I reviewed the NP or PA documentation, treatment plan, and medical decision making. Talya Sheldon MD 10/7/2018 7:27 AM                  Subjective   Piyush Wren is a 23 y.o.female who presents to the ED with c/o abdominal pain with onset one week ago. She states that she went to a [facility] yesterday for RUQ abdominal pain and nausea, where she had labs drawn and a ultrasound of her gallblader. She states she had abnormal liver enzymes and was told she has a gall stone. She reports her symptoms have gradually worsened today, especially after eating, and states her abdominal pain radiated to her mid back. She notes she called her PCP at that time, who recommended she come to the ED for further evaluation. She also complains of chills and \"hot flashes\". She reports one episode of vomiting two days ago, but denies any vomiting since. She also notes recently decreased appetite, and states she her last normal meal was pasta around 1730 today. Ms. Wren denies any shortness of breath or any other acute complaints. She notes past surgical history of a cyst removal from her abdomen, but denies any other abdominal surgeries. She denies any current chance of pregnancy. The patient denies any recent sick contacts.        History provided by:  Patient  Abdominal Pain   Pain location:  RUQ  Pain radiates to:  Back  Pain severity:  Moderate  Onset quality:  Sudden  Duration:  1 " "week  Timing:  Constant  Progression:  Worsening  Chronicity:  New  Context: not sick contacts    Relieved by:  None tried  Worsened by:  Eating  Ineffective treatments:  None tried  Associated symptoms: chills, nausea and vomiting (currently resolved)    Associated symptoms: no shortness of breath    Risk factors: not pregnant        Review of Systems   Constitutional: Positive for appetite change (recent reduced appetite) and chills.        Patient complains of \"hot flashes\"   Respiratory: Negative for shortness of breath.    Gastrointestinal: Positive for abdominal pain (RUQ pain, which radiates to the mid back), nausea and vomiting (currently resolved).   Musculoskeletal: Positive for back pain.   All other systems reviewed and are negative.      History reviewed. No pertinent past medical history.    Allergies   Allergen Reactions   • Penicillins Rash       History reviewed. No pertinent surgical history.    History reviewed. No pertinent family history.    Social History     Social History   • Marital status: Single     Social History Main Topics   • Smoking status: Never Smoker   • Alcohol use No   • Drug use: No     Other Topics Concern   • Not on file         Objective   Physical Exam   Constitutional: She is oriented to person, place, and time. She appears well-developed and well-nourished. No distress.   HENT:   Head: Normocephalic and atraumatic.   Eyes: Conjunctivae are normal. No scleral icterus.   Neck: Normal range of motion. Neck supple.   Cardiovascular: Normal rate, regular rhythm and normal heart sounds.    No murmur heard.  Pulmonary/Chest: Effort normal and breath sounds normal. No respiratory distress.   Abdominal: Soft. Bowel sounds are normal. There is tenderness. There is no rebound and no guarding.   Moderate tenderness in the RUQ and mild tenderness in the epigastrium.   Musculoskeletal: Normal range of motion. She exhibits no tenderness.   Neurological: She is alert and oriented to " person, place, and time.   Skin: Skin is warm and dry. She is not diaphoretic.   Psychiatric: She has a normal mood and affect. Her behavior is normal.   Nursing note and vitals reviewed.      Procedures        ED Course  ED Course as of Oct 07 0224   Sat Oct 06, 2018   2332 Bilirubin, UA: (!) Moderate (2+) [ML]   Sun Oct 07, 2018   0150 Lymphocyte %: 28.9 [ML]      ED Course User Index  [ML] Carrol Pang APRN      Recent Results (from the past 24 hour(s))   Comprehensive Metabolic Panel    Collection Time: 10/06/18 12:28 PM   Result Value Ref Range    Glucose 87 70 - 100 mg/dL    BUN <5 (L) 9 - 23 mg/dL    Creatinine 0.73 0.60 - 1.30 mg/dL    Sodium 140 132 - 146 mmol/L    Potassium 4.3 3.5 - 5.5 mmol/L    Chloride 106 99 - 109 mmol/L    CO2 24.0 20.0 - 31.0 mmol/L    Calcium 9.7 8.7 - 10.4 mg/dL    Total Protein 7.2 5.7 - 8.2 g/dL    Albumin 4.44 3.20 - 4.80 g/dL    ALT (SGPT) 341 (H) 7 - 40 U/L    AST (SGOT) 203 (H) 0 - 33 U/L    Alkaline Phosphatase 209 (H) 25 - 100 U/L    Total Bilirubin 5.3 (H) 0.3 - 1.2 mg/dL    eGFR Non African Amer 100 >60 mL/min/1.73    Globulin 2.8 gm/dL    A/G Ratio 1.6 1.5 - 2.5 g/dL    BUN/Creatinine Ratio  7.0 - 25.0    Anion Gap 10.0 3.0 - 11.0 mmol/L   Comprehensive Metabolic Panel    Collection Time: 10/06/18 10:29 PM   Result Value Ref Range    Glucose 99 70 - 100 mg/dL    BUN <5 (L) 9 - 23 mg/dL    Creatinine 0.69 0.60 - 1.30 mg/dL    Sodium 137 132 - 146 mmol/L    Potassium 3.7 3.5 - 5.5 mmol/L    Chloride 106 99 - 109 mmol/L    CO2 22.0 20.0 - 31.0 mmol/L    Calcium 9.5 8.7 - 10.4 mg/dL    Total Protein 7.4 5.7 - 8.2 g/dL    Albumin 4.37 3.20 - 4.80 g/dL    ALT (SGPT) 316 (H) 7 - 40 U/L    AST (SGOT) 172 (H) 0 - 33 U/L    Alkaline Phosphatase 199 (H) 25 - 100 U/L    Total Bilirubin 4.4 (H) 0.3 - 1.2 mg/dL    eGFR Non African Amer 106 >60 mL/min/1.73    Globulin 3.0 gm/dL    A/G Ratio 1.4 (L) 1.5 - 2.5 g/dL    BUN/Creatinine Ratio  7.0 - 25.0    Anion Gap 9.0 3.0 - 11.0  mmol/L   Lipase    Collection Time: 10/06/18 10:29 PM   Result Value Ref Range    Lipase 24 6 - 51 U/L   Green Top (Gel)    Collection Time: 10/06/18 10:29 PM   Result Value Ref Range    Extra Tube Hold for add-ons.    Urinalysis With Microscopic If Indicated (No Culture) - Urine, Clean Catch    Collection Time: 10/06/18 11:20 PM   Result Value Ref Range    Color, UA Yellow Yellow, Straw    Appearance, UA Clear Clear    pH, UA 6.0 5.0 - 8.0    Specific Gravity, UA <=1.005 1.005 - 1.030    Glucose,  mg/dL (1+) (A) Negative    Ketones, UA Negative Negative    Bilirubin, UA Moderate (2+) (A) Negative    Blood, UA Negative Negative    Protein, UA Negative Negative    Leuk Esterase, UA Negative Negative    Nitrite, UA Negative Negative    Urobilinogen, UA 0.2 E.U./dL 0.2 - 1.0 E.U./dL   POCT pregnancy, urine    Collection Time: 10/06/18 11:23 PM   Result Value Ref Range    HCG, Urine, QL Negative Negative    Lot Number [id number]     Internal Positive Control Positive     Internal Negative Control Negative    Lavender Top    Collection Time: 10/06/18 11:56 PM   Result Value Ref Range    Extra Tube hold for add-on    CBC Auto Differential    Collection Time: 10/06/18 11:56 PM   Result Value Ref Range    WBC 8.17 3.50 - 10.80 10*3/mm3    RBC 4.70 3.89 - 5.14 10*6/mm3    Hemoglobin 13.2 11.5 - 15.5 g/dL    Hematocrit 40.0 34.5 - 44.0 %    MCV 85.1 80.0 - 99.0 fL    MCH 28.1 27.0 - 31.0 pg    MCHC 33.0 32.0 - 36.0 g/dL    RDW 13.3 11.3 - 14.5 %    RDW-SD 41.2 37.0 - 54.0 fl    MPV 10.0 6.0 - 12.0 fL    Platelets 328 150 - 450 10*3/mm3    Neutrophil % 58.2 41.0 - 71.0 %    Lymphocyte % 28.9 24.0 - 44.0 %    Monocyte % 10.6 0.0 - 12.0 %    Eosinophil % 1.8 0.0 - 3.0 %    Basophil % 0.4 0.0 - 1.0 %    Immature Grans % 0.1 0.0 - 0.6 %    Neutrophils, Absolute 4.75 1.50 - 8.30 10*3/mm3    Lymphocytes, Absolute 2.36 0.60 - 4.80 10*3/mm3    Monocytes, Absolute 0.87 0.00 - 1.00 10*3/mm3    Eosinophils, Absolute 0.15 0.00 -  "0.30 10*3/mm3    Basophils, Absolute 0.03 0.00 - 0.20 10*3/mm3    Immature Grans, Absolute 0.01 0.00 - 0.03 10*3/mm3     Note: In addition to lab results from this visit, the labs listed above may include labs taken at another facility or during a different encounter within the last 24 hours. Please correlate lab times with ED admission and discharge times for further clarification of the services performed during this visit.    CT Abdomen Pelvis With Contrast   Final Result     Diffuse dilatation of the biliary tree and gallbladder without visualized    stone or pericholecystic inflammation.        Abrupt tapering of the common bile duct within the pancreatic head without    visualized pancreatic lesion.  No pancreatic ductal dilatation.  Findings could    reflect a stricture or a nonvisualized pancreatic lesion.  GI evaluation    recommended.      THIS DOCUMENT HAS BEEN ELECTRONICALLY SIGNED BY TOBY FORTUNE MD        Vitals:    10/06/18 2103   BP: 138/83   BP Location: Left arm   Patient Position: Sitting   Pulse: 98   Resp: 16   Temp: 98.2 °F (36.8 °C)   TempSrc: Oral   SpO2: 98%   Weight: 103 kg (226 lb)   Height: 170.2 cm (67\")     Medications   sodium chloride 0.9 % flush 10 mL (not administered)   sodium chloride 0.9 % bolus 1,000 mL (1,000 mL Intravenous New Bag 10/6/18 2343)   HYDROmorphone (DILAUDID) injection 1 mg (1 mg Intravenous Given 10/6/18 2346)   iopamidol (ISOVUE-300) 61 % injection 100 mL (100 mL Intravenous Given 10/7/18 0021)     ECG/EMG Results (last 24 hours)     ** No results found for the last 24 hours. **                          [facility]    Final diagnoses:   Elevated liver enzymes       Documentation assistance provided by palmer Phelps.  Information recorded by the palmer was done at my direction and has been verified and validated by me.     Brent Phelps  10/06/18 7442       Carrol Pang APRN  10/07/18 1762      Electronically signed by Talya Sheldon MD at " 10/7/2018  7:27 AM             ICU Vital Signs     Row Name 10/08/18 0716 10/08/18 0400 10/08/18 0000 10/07/18 2000 10/07/18 1520       Vitals    Temp 97.4 °F (36.3 °C) 97.9 °F (36.6 °C) 98.5 °F (36.9 °C) 98.3 °F (36.8 °C) 98.4 °F (36.9 °C)    Temp src  -- Oral Oral Oral Oral    Pulse 75 73 80 67 64    Heart Rate Source  -- Monitor Monitor Monitor Monitor    Resp 16 16 16 16 15    Resp Rate Source  -- Visual Visual Visual Visual    /77 108/59 140/86 117/56 141/76    Noninvasive MAP (mmHg)  --  --  --  -- 104    BP Location  -- Right arm Right arm Right arm Right arm    BP Method  -- Automatic Automatic Automatic Automatic    Patient Position  -- Lying Lying Lying Lying       Oxygen Therapy    SpO2 99 % 98 % 98 % 97 % 98 %    Pulse Oximetry Type  --  --  -- Continuous Continuous    Device (Oxygen Therapy) room air  --  -- room air room air    Row Name 10/07/18 1401 10/07/18 1345 10/07/18 1330 10/07/18 1315 10/07/18 1310       Vitals    Temp 97.7 °F (36.5 °C) 98.2 °F (36.8 °C) 98.2 °F (36.8 °C) 98.2 °F (36.8 °C) 98.2 °F (36.8 °C)    Temp src Oral Temporal Artery  Temporal Artery  Temporal Artery  Temporal Artery     Pulse 65 64 65 74 71    Heart Rate Source Monitor Monitor Monitor Monitor Monitor    Resp 16 16 18 18 18    Resp Rate Source Visual Visual Visual Visual Visual    /77 137/77 134/83 140/87 140/87    BP Location Right arm  --  --  --  --    BP Method Automatic  --  --  --  --    Patient Position Lying  --  --  --  --       Oxygen Therapy    SpO2 98 % 97 % 98 % 98 % 98 %    Pulse Oximetry Type  -- Continuous Continuous Continuous Continuous    Device (Oxygen Therapy) room air nasal cannula nasal cannula nasal cannula nasal cannula    Flow (L/min)  -- 2 2 2 2    Row Name 10/07/18 1309 10/07/18 1308                Vitals    Temp 98.2 °F (36.8 °C) 98.2 °F (36.8 °C)       Temp src  -- Temporal Artery        Pulse 95 97       Heart Rate Source Monitor Monitor       Resp  -- 18       Resp Rate Source   "-- Visual       /84 149/87          Oxygen Therapy    SpO2 99 % 98 %       Pulse Oximetry Type  -- Continuous       Device (Oxygen Therapy)  -- nasal cannula       Flow (L/min)  -- 2           Hospital Medications (active)       Dose Frequency Start End    benzocaine-menthol (CEPACOL) lozenge 1 lozenge 1 lozenge Every 2 Hours PRN 10/7/2018     Sig - Route: Dissolve 1 lozenge in the mouth Every 2 (Two) Hours As Needed for Sore Throat. - Mouth/Throat    HYDROmorphone (DILAUDID) injection 0.25 mg 0.25 mg Every 4 Hours PRN 10/7/2018 10/17/2018    Sig - Route: Infuse 0.25 mL into a venous catheter Every 4 (Four) Hours As Needed for Moderate Pain  or Severe Pain . - Intravenous    HYDROmorphone (DILAUDID) injection 0.5 mg 0.5 mg Once 10/8/2018 10/8/2018    Sig - Route: Infuse 0.5 mL into a venous catheter 1 (One) Time. - Intravenous    levoFLOXacin (LEVAQUIN) 500 mg/100 mL D5W (premix) (LEVAQUIN) 500 mg 500 mg Once 10/7/2018 10/7/2018    Sig - Route: Infuse 100 mL into a venous catheter 1 (One) Time. - Intravenous    ondansetron (ZOFRAN) injection 4 mg 4 mg Every 6 Hours PRN 10/7/2018     Sig - Route: Infuse 2 mL into a venous catheter Every 6 (Six) Hours As Needed for Nausea or Vomiting. - Intravenous    pantoprazole (PROTONIX) injection 40 mg 40 mg Every Early Morning 10/8/2018     Sig - Route: Infuse 10 mL into a venous catheter Every Morning. - Intravenous    promethazine (PHENERGAN) injection 12.5 mg 12.5 mg Every 6 Hours PRN 10/8/2018     Sig - Route: Infuse 0.5 mL into a venous catheter Every 6 (Six) Hours As Needed for Nausea or Vomiting. - Intravenous    Linked Group 1:  \"Or\" Linked Group Details        promethazine (PHENERGAN) tablet 12.5 mg 12.5 mg Every 6 Hours PRN 10/8/2018     Sig - Route: Take 1 tablet by mouth Every 6 (Six) Hours As Needed for Nausea or Vomiting. - Oral    Linked Group 1:  \"Or\" Linked Group Details        sodium chloride 0.9 % flush 10 mL 10 mL As Needed 10/6/2018     Sig - Route: " Infuse 10 mL into a venous catheter As Needed for Line Care. - Intravenous    Cosign for Ordering: Accepted by Talya Sheldon MD on 10/7/2018  7:29 AM    sodium chloride 0.9 % flush 3 mL 3 mL Every 12 Hours Scheduled 10/7/2018     Sig - Route: Infuse 3 mL into a venous catheter Every 12 (Twelve) Hours. - Intravenous    sodium chloride 0.9 % flush 3-10 mL 3-10 mL As Needed 10/7/2018     Sig - Route: Infuse 3-10 mL into a venous catheter As Needed for Line Care. - Intravenous    sodium chloride 0.9 % infusion 75 mL/hr Continuous 10/7/2018 10/8/2018    Sig - Route: Infuse 75 mL/hr into a venous catheter Continuous. - Intravenous    dexamethasone (DECADRON) injection (Discontinued)  As Needed 10/7/2018 10/7/2018    Sig - Route: Infuse  into a venous catheter As Needed. - Intravenous    Reason for Discontinue: Anesthesia Stop    fentaNYL citrate (PF) (SUBLIMAZE) injection 50 mcg (Discontinued) 50 mcg Every 5 Minutes PRN 10/7/2018 10/7/2018    Sig - Route: Infuse 1 mL into a venous catheter Every 5 (Five) Minutes As Needed for Moderate Pain . - Intravenous    Reason for Discontinue: Patient Transfer    fentaNYL citrate (PF) (SUBLIMAZE) injection (Discontinued)  As Needed 10/7/2018 10/7/2018    Sig - Route: Infuse  into a venous catheter As Needed for Severe Pain . - Intravenous    Reason for Discontinue: Anesthesia Stop    HYDROmorphone (DILAUDID) injection 0.5 mg (Discontinued) 0.5 mg Every 5 Minutes PRN 10/7/2018 10/7/2018    Sig - Route: Infuse 0.5 mL into a venous catheter Every 5 (Five) Minutes As Needed for Severe Pain . - Intravenous    Reason for Discontinue: Patient Transfer    indomethacin (INDOCIN) 50 MG suppository 100 mg (Discontinued) 100 mg Once 10/7/2018 10/7/2018    Sig - Route: Insert 2 suppositories into the rectum 1 (One) Time. - Rectal    Reason for Discontinue: Patient Transfer    indomethacin (INDOCIN) 50 MG suppository (Discontinued)  As Needed 10/7/2018 10/7/2018    Sig: As Needed.     "Reason for Discontinue: Patient Discharge    iopamidol (ISOVUE-300) 61 % injection (Discontinued)  As Needed 10/7/2018 10/7/2018    Sig: As Needed.    Reason for Discontinue: Patient Discharge    lactated ringers bolus 500 mL (Discontinued) 500 mL Once As Needed 10/7/2018 10/7/2018    Sig - Route: Infuse 500 mL into a venous catheter 1 (One) Time As Needed (for hypovolemia, call anesthesiologist before initiating). - Intravenous    Reason for Discontinue: Patient Transfer    lactated ringers infusion (Discontinued)  Continuous PRN 10/7/2018 10/7/2018    Sig - Route: Infuse  into a venous catheter Continuous As Needed. - Intravenous    Reason for Discontinue: Anesthesia Stop    lidocaine (XYLOCAINE) 1 % injection (Discontinued)  As Needed 10/7/2018 10/7/2018    Sig: As Needed.    Reason for Discontinue: Anesthesia Stop    ondansetron (ZOFRAN) injection (Discontinued)  As Needed 10/7/2018 10/7/2018    Sig - Route: Infuse  into a venous catheter As Needed for Nausea or Vomiting. - Intravenous    Reason for Discontinue: Anesthesia Stop    promethazine (PHENERGAN) injection 6.25 mg (Discontinued) 6.25 mg Once As Needed 10/7/2018 10/7/2018    Sig - Route: Infuse 0.25 mL into a venous catheter 1 (One) Time As Needed for Nausea or Vomiting. - Intravenous    Reason for Discontinue: Patient Transfer    Linked Group 2:  \"Or\" Linked Group Details        promethazine (PHENERGAN) injection 6.25 mg (Discontinued) 6.25 mg Once As Needed 10/7/2018 10/7/2018    Sig - Route: Inject 0.25 mL into the appropriate muscle as directed by prescriber 1 (One) Time As Needed for Nausea or Vomiting. - Intramuscular    Reason for Discontinue: Patient Transfer    Linked Group 2:  \"Or\" Linked Group Details        promethazine (PHENERGAN) suppository 25 mg (Discontinued) 25 mg Once As Needed 10/7/2018 10/7/2018    Sig - Route: Insert 1 suppository into the rectum 1 (One) Time As Needed for Nausea or Vomiting. - Rectal    Reason for Discontinue: " "Patient Transfer    Linked Group 2:  \"Or\" Linked Group Details        promethazine (PHENERGAN) tablet 25 mg (Discontinued) 25 mg Once As Needed 10/7/2018 10/7/2018    Sig - Route: Take 1 tablet by mouth 1 (One) Time As Needed for Nausea or Vomiting. - Oral    Reason for Discontinue: Patient Transfer    Linked Group 2:  \"Or\" Linked Group Details        Propofol (DIPRIVAN) injection (Discontinued)  As Needed 10/7/2018 10/7/2018    Sig - Route: Infuse  into a venous catheter As Needed. - Intravenous    Reason for Discontinue: Anesthesia Stop    sterile water irrigation solution (Discontinued)  As Needed 10/7/2018 10/7/2018    Sig: As Needed.    Reason for Discontinue: Patient Discharge    succinylcholine (ANECTINE) injection (Discontinued)  As Needed 10/7/2018 10/7/2018    Sig - Route: Infuse  into a venous catheter As Needed. - Intravenous    Reason for Discontinue: Anesthesia Stop             Operative/Procedure Notes (all)      Brunner, Mark I, MD at 10/7/2018 10:55 AM  Version 1 of 1       ENDOSCOPIC RETROGRADE CHOLANGIOPANCREATOGRAPHY  Progress Note    Piyush LIBERTY Holger  10/6/2018 - 10/7/2018    ERCP shows a large obstructing stone in the lower CBD, unable to be removed. 10 Fr x 7 cm temporary stent placed, with excellent flow of clear bile.    >> Repeat ERCP in ~4 weeks to remove stone and stent, +/- Spyglass lithotripsy    Mark I. Brunner, MD     Date: 10/7/2018  Time: 1:05 PM        Electronically signed by Brunner, Mark I, MD at 10/7/2018  1:08 PM     Procedures signed by Brunner, Mark I, MD at 10/7/2018  1:52 PM   Version 1 of 1     Procedure Orders:    1. ERCP [[id number]] ordered by Brunner, Mark I, MD at 10/07/18 1159           Scan on 10/7/2018 11:59 AM by Brunner, Mark I, MD (below)            Electronically signed by Brunner, Mark I, MD at 10/7/2018  1:52 PM          Physician Progress Notes (all)      Sivan Montoya MD at 10/8/2018 10:04 AM              [facility] Medicine " "Services  PROGRESS NOTE    Patient Name: Piyush Wren  : 1995  MRN: [id number]    Date of Admission: 10/6/2018  Length of Stay: 1  Primary Care Physician: Provider, No Known    Subjective   Subjective     CC:  Abdominal pain    HPI:  Pt doing okay this am, but was still nauseated despite Zofran earlier this am- has since received IV Phenergan and nausea is better.  Still with abdominal pain but not \"as bad\" as on admission.  Unable to tolerate much PO intake.     Review of Systems  Gen- No fevers, chills  CV- No chest pain, palpitations  Resp- No cough, dyspnea  GI- as above    Otherwise ROS is negative except as mentioned in the HPI.    Objective   Objective     Vital Signs:   Temp:  [97.4 °F (36.3 °C)-98.5 °F (36.9 °C)] 97.4 °F (36.3 °C)  Heart Rate:  [64-97] 75  Resp:  [15-18] 16  BP: (108-149)/(56-87) 132/77        Physical Exam:  Constitutional: No acute distress, awake, alert, obese WF  HENT: NCAT, mucous membranes moist  Respiratory: Clear to auscultation bilaterally, respiratory effort normal   Cardiovascular: RRR, no murmurs, rubs, or gallops, palpable pedal pulses bilaterally  Gastrointestinal: mildly tender to light palpation, + BS, ND  Musculoskeletal: No bilateral ankle edema  Psychiatric: Appropriate affect, cooperative  Neurologic: Oriented x 3, strength symmetric in all extremities, Cranial Nerves grossly intact to confrontation, speech clear  Skin: No rashes  Results Reviewed:  I have personally reviewed current lab, radiology, and data and agree.      Results from last 7 days  Lab Units 10/08/18  0600 10/07/18  0609 10/06/18  2356   WBC 10*3/mm3 11.40* 6.03 8.17   HEMOGLOBIN g/dL 11.9 11.9 13.2   HEMATOCRIT % 37.3 37.5 40.0   PLATELETS 10*3/mm3 304 312 328       Results from last 7 days  Lab Units 10/08/18  0600 10/07/18  0609 10/06/18  2229   SODIUM mmol/L 136 137 137   POTASSIUM mmol/L 4.0 4.0 3.7   CHLORIDE mmol/L 102 104 106   CO2 mmol/L 25.0 26.0 22.0   BUN mg/dL 6* <5* <5* "   CREATININE mg/dL 0.65 0.65 0.69   GLUCOSE mg/dL 80 96 99   CALCIUM mg/dL 8.7 8.7 9.5   ALT (SGPT) U/L 246* 269* 316*   AST (SGOT) U/L 155* 128* 172*     Estimated Creatinine Clearance: 167.6 mL/min (by C-G formula based on SCr of 0.65 mg/dL).  No results found for: BNP    Microbiology Results Abnormal     None          Imaging Results (last 24 hours)     Procedure Component Value Units Date/Time    FL ERCP pancreatic and biliary ducts [[id number]] Collected:  10/07/18 1540     Updated:  10/07/18 1850    Narrative:       EXAMINATION: FL ERCP PANCREATIC AND BILIARY DUCTS - 10/7/2018     INDICATION: R74.8-Abnormal levels of other serum enzymes;  K80.51-Calculus of bile duct without cholangitis or cholecystitis with  obstruction.     TECHNIQUE:  Fluoroscopic images were provided for a clinical service  without radiologist present.  The fluoroscopy time was 14 minutes 5  seconds. 4 spot images were provided.     COMPARISONS:  MRCP 7 hours.       Impression:       FINDINGS/IMPRESSION: Imaging provided for ERCP.  See procedure note for  details.     DICTATED:   10/7/2018  EDITED/ls :   10/7/2018         This report was finalized on 10/7/2018 6:48 PM by Preet Luis.       MRI abdomen wo contrast mrcp [[id number]] Collected:  10/07/18 1149     Updated:  10/07/18 1450    Narrative:       EXAMINATION: MRI ABDOMEN WO CONTRAST MRCP - 10/7/2018     INDICATION: R74.8-Abnormal levels of other serum enzymes.     TECHNIQUE:  Noncontrast MRI of the abdomen was performed utilizing MRCP  protocol.     COMPARISONS:  CT abdomen/pelvis 4 hours prior.     FINDINGS:  Marked intrahepatic and extrahepatic biliary ductal  dilatation is seen with a stone in the mid common bile duct. There are  stones in the distended gallbladder. No pericholecystic inflammatory  change. There is normal background signal without steatosis or  cirrhosis. No focal lesion is seen on the T2-weighted series. Pancreas  is normal without evidence of ductal dilatation or  divisum. The adrenal  glands, kidneys and spleen are unremarkable. No dilated small or large  bowel is seen in the imaged field of view. No enlarged lymph nodes or  free fluid is seen in the image field of view.       Impression:       1. Obstructing calculus in the common bile duct causing intrahepatic and  extrahepatic biliary ductal dilatation.  2. Cholelithiasis without evidence of acute cholecystitis.     DICTATED:   10/07/2018  EDITED/ls :   10/07/2018      This report was finalized on 10/7/2018 2:48 PM by Preet Luis.                I have reviewed the medications.      pantoprazole 40 mg Intravenous Q AM   sodium chloride 3 mL Intravenous Q12H         Assessment/Plan   Assessment / Plan     Hospital Problem List     * (Principal)Choledocholithiasis    Elevated liver enzymes    Hyperbilirubinemia    Cholelithiasis    Intractable abdominal pain    Jaundice             Brief Hospital Course to date:  Piyush Wren is a 23 y.o. female with no PMH presents with abdominal pain- found to have elevated LFTs and hyperbilirubinemia in the ED.  CT A/P showed ductal dilatation and MRCP revealed obstructing stone in CBD.  She was admitted to our service and GI was consulted.       Assessment & Plan:  --Choledocholithiasis- s/p ERCP with stent placement, GI unable to remove stone.  General Surgery consulted, deferring surgical intervention for time being.  Await GI recs.  --Elevated LFTs and hyperbilirubinemia-due to above, improving today. Continue to monitor.     DVT Prophylaxis:  mechanical    CODE STATUS:   Code Status and Medical Interventions:   Ordered at: 10/07/18 0238     Level Of Support Discussed With:    Patient     Code Status:    CPR     Medical Interventions (Level of Support Prior to Arrest):    Full       Disposition: I expect the patient to be discharged TBD      Electronically signed by Sivan Montoya MD, 10/08/18, 11:04 AM.      Electronically signed by Sivan Montoya MD at 10/8/2018  11:09 AM          Consult Notes (all)      Derek Escalante MD at 10/7/2018  2:57 PM      Consult Orders:    1. Inpatient General Surgery Consult [[id number]] ordered by Felicita Mccall DO at 10/07/18 1255                General Surgery Consultation Note    Date of Service: 10/7/2018  Piyush LIBERTY Holger  [id number]  1995      Referring Provider: Felicita Mccall DO    Location of Consult: Hospital floor     Reason for Consultation: Cholelithiasis       History of Present Illness:  I am seeing, Piyush Wren, in consultation for Felicita Mccall DO regarding cholelithiasis.  23-year-old young lady was admitted to the hospital through the emergency room with right upper quadrant abdominal pain.  Her pain has been ongoing for approximately 1 week and is worse with greasy fatty type foods.  She has had associated nausea and vomiting.  Her pain is colicky.  Her workup with the right upper quadrant ultrasound, MRCP, and CT scan demonstrated choledocholithiasis and cholelithiasis.  She was taken to the operating room by Dr. Brunner with gastroenterology for ERCP today.  He was unable to retrieve the large common bile duct stone and stented the common bile duct.     History reviewed. No pertinent past medical history.    Allergies   Allergen Reactions   • Penicillins Rash       No current facility-administered medications on file prior to encounter.      No current outpatient prescriptions on file prior to encounter.         Current Facility-Administered Medications:   •  benzocaine-menthol (CEPACOL) lozenge 1 lozenge, 1 lozenge, Mouth/Throat, Q2H PRN, Brunner, Mark I, MD  •  HYDROmorphone (DILAUDID) injection 0.25 mg, 0.25 mg, Intravenous, Q4H PRN, Brunner, Mark I, MD, 0.25 mg at 10/07/18 1445  •  ondansetron (ZOFRAN) injection 4 mg, 4 mg, Intravenous, Q6H PRN, Mono Torres PA-C  •  sodium chloride 0.9 % flush 10 mL, 10 mL, Intravenous, PRN, Talya Sheldon MD  •  sodium chloride 0.9 % flush  3 mL, 3 mL, Intravenous, Q12H, Mono Torres PA-C  •  sodium chloride 0.9 % flush 3-10 mL, 3-10 mL, Intravenous, PRN, Mono Torres PA-C  •  sodium chloride 0.9 % infusion, 75 mL/hr, Intravenous, Continuous, AtkinsMalloryca L, DO, Last Rate: 75 mL/hr at 10/07/18 1428, 75 mL/hr at 10/07/18 1428    Past Surgical History:   • PILONIDAL CYST DRAINAGE       Family History   Problem Relation Age of Onset   • COPD Mother    • Hyperlipidemia Father    • Heart disease Father      Social History     Social History   • Marital status: Single     Spouse name: N/A   • Number of children: N/A   • Years of education: N/A     Occupational History   • Not on file.     Social History Main Topics   • Smoking status: Never Smoker   • Smokeless tobacco: Not on file   • Alcohol use No   • Drug use: No   • Sexual activity: Defer     Other Topics Concern   • Not on file     Social History Narrative   • No narrative on file       Review of Systems:  Review of Systems   Constitutional: Positive for appetite change. Negative for fever.   HENT: Negative for ear discharge, nosebleeds, sneezing and trouble swallowing.    Eyes: Negative for photophobia and visual disturbance.   Respiratory: Negative for cough, chest tightness, shortness of breath and wheezing.    Cardiovascular: Negative for chest pain, palpitations and leg swelling.   Gastrointestinal: Positive for abdominal pain, nausea and vomiting. Negative for blood in stool.   Endocrine: Negative for polyphagia and polyuria.   Genitourinary: Negative for decreased urine volume, difficulty urinating, dysuria, flank pain and urgency.   Musculoskeletal: Negative for arthralgias, joint swelling and myalgias.   Skin: Negative for pallor and rash.   Allergic/Immunologic: Negative for immunocompromised state.   Neurological: Negative for syncope, numbness and headaches.   Hematological: Negative for adenopathy.   Psychiatric/Behavioral: Negative for behavioral problems, decreased  "concentration and sleep disturbance. The patient is not hyperactive.      Otherwise the 12 point review of systems is negative.    /77 (BP Location: Right arm, Patient Position: Lying)   Pulse 65   Temp 97.7 °F (36.5 °C) (Oral)   Resp 16   Ht 170.2 cm (67\")   Wt 103 kg (226 lb 1.6 oz)   LMP 09/27/2018 Comment: neg hcg 10/6/2018  SpO2 98%   BMI 35.41 kg/m²    Body mass index is 35.41 kg/m².    General: Pleasantly conversant  HEENT: PER, icteric sclera  Cardiac: regular rhythm,  no audible rubs  Pulmonary: bilateral breath sounds, non labored  Abdominal: Soft, minimal tenderness, no palpable hepatosplenomegaly  Neurologic: awake, alert, no obvious focal deficits  Extremities: warm, no edema  Skin: no obvious rashes nor worrisome lesions seen     CBC    Results from last 7 days  Lab Units 10/07/18  0609   WBC 10*3/mm3 6.03   HEMOGLOBIN g/dL 11.9   HEMATOCRIT % 37.5   PLATELETS 10*3/mm3 312       CMP    Results from last 7 days  Lab Units 10/07/18  0609   SODIUM mmol/L 137   POTASSIUM mmol/L 4.0   CHLORIDE mmol/L 104   CO2 mmol/L 26.0   BUN mg/dL <5*   CREATININE mg/dL 0.65   CALCIUM mg/dL 8.7   BILIRUBIN mg/dL 4.6*   ALK PHOS U/L 185*   ALT (SGPT) U/L 269*   AST (SGOT) U/L 128*   GLUCOSE mg/dL 96       Radiology  Imaging Results (last 72 hours)     Procedure Component Value Units Date/Time    MRI abdomen wo contrast mrcp [[id number]] Collected:  10/07/18 1149     Updated:  10/07/18 1450    Narrative:       EXAMINATION: MRI ABDOMEN WO CONTRAST MRCP - 10/7/2018     INDICATION: R74.8-Abnormal levels of other serum enzymes.     TECHNIQUE:  Noncontrast MRI of the abdomen was performed utilizing MRCP  protocol.     COMPARISONS:  CT abdomen/pelvis 4 hours prior.     FINDINGS:  Marked intrahepatic and extrahepatic biliary ductal  dilatation is seen with a stone in the mid common bile duct. There are  stones in the distended gallbladder. No pericholecystic inflammatory  change. There is normal background signal " without steatosis or  cirrhosis. No focal lesion is seen on the T2-weighted series. Pancreas  is normal without evidence of ductal dilatation or divisum. The adrenal  glands, kidneys and spleen are unremarkable. No dilated small or large  bowel is seen in the imaged field of view. No enlarged lymph nodes or  free fluid is seen in the image field of view.       Impression:       1. Obstructing calculus in the common bile duct causing intrahepatic and  extrahepatic biliary ductal dilatation.  2. Cholelithiasis without evidence of acute cholecystitis.     DICTATED:   10/07/2018  EDITED/ls :   10/07/2018      This report was finalized on 10/7/2018 2:48 PM by Preet Luis.       FL ERCP pancreatic and biliary ducts [[id number]] Updated:  10/07/18 1315    CT Abdomen Pelvis With Contrast [[id number]] Collected:  10/06/18 2327     Updated:  10/07/18 0132    Narrative:       EXAM:    CT Abdomen and Pelvis With Intravenous Contrast    EXAM DATE/TIME:    10/6/2018 11:27 PM    CLINICAL HISTORY:    23 years old, female; Elevated liver enzymes. Epigastric pain, n/v,   constipation    TECHNIQUE:    Axial computed tomography images of the abdomen and pelvis with intravenous   contrast.  All CT scans at this facility use at least one of these dose   optimization techniques: automated exposure control; mA and/or kV adjustment   per patient size (includes targeted exams where dose is matched to clinical   indication); or iterative reconstruction.    Coronal and sagittal reformatted images were created and reviewed.    CONTRAST:    100 mL of iso 300 administered intravenously.      COMPARISON:    US GALLBLADDER 10/5/2018 1:03 PM    FINDINGS:    There is moderate to severe diffuse intra-and extrahepatic biliary   dilatation, the common bile duct measuring up to 1.9 cm. the gallbladder is   distended, measuring up to 11 cm.  No pericholecystic inflammation.  No   visualized gallstone or biliary stone.      The common bile duct tapers  abruptly within the pancreatic head.  However,   there is no visualized pancreatic lesion.  There is no acute pancreatitis or   pancreatic ductal dilatation.      The lung bases, liver, spleen, adrenal glands, kidneys, uterus, ovaries, and   urinary bladder are normal.      There is no bowel inflammation, obstruction, free intraperitoneal air, or   ascites.  The appendix is normal.      No acute osseous abnormality.      Impression:         Diffuse dilatation of the biliary tree and gallbladder without visualized   stone or pericholecystic inflammation.      Abrupt tapering of the common bile duct within the pancreatic head without   visualized pancreatic lesion.  No pancreatic ductal dilatation.  Findings could   reflect a stricture or a nonvisualized pancreatic lesion.  GI evaluation   recommended.    THIS DOCUMENT HAS BEEN ELECTRONICALLY SIGNED BY TOBY FORTUNE MD            Assessment:  Choledocholithiasis with cholelithiasis    Plan:  Gastroenterology was unable to retrieve the common bile duct stone.  Her common bile duct was stented to help alleviate her symptoms.  The cystic duct on ERCP is patent.  On imaging there is no evidence of acute cholecystitis.  I discussed the case directly with Dr. Brunner.  He is going to reattempt her ERCP in a few weeks to retrieve the common bile duct stone.  My preference is to wait until common bile duct clearance for cholecystectomy, if gastroenterology is unable to clear her common bile duct I can do so through a common bile duct exploration.  I discussed this with the patient and her family.  All of their questions were answered. She may follow-up in the office as an outpatient once her common bile duct has been cleared.  Thank you very much for consultation.    Derek Escalante MD  10/07/18  2:57 PM      Electronically signed by Derek Escalante MD at 10/7/2018  3:07 PM     Brunner, Mark I, MD at 10/7/2018  9:30 AM      Consult Orders:    1. Inpatient  Gastroenterology Consult [[id number]] ordered by Mono Torres PA-C at 10/07/18 0317                [facility]    Referring Provider: Felicita Mccall DO    PCP: Provider, No Known    Reason for Consultation: Choledocholithiasis    Chief complaint: RUQ pain    History of present illness:    Piyush Wren is a 23 y.o. female who is admitted with 1-week history of progressive RUQ pain, worse with fatty meals. There is associated N/V. Outside evaluation at [facility] showed gallstones on US and abnormal LFT's. CT shows biliary dilation.    Allergies:  Penicillins    Scheduled Meds:    sodium chloride 3 mL Intravenous Q12H        Infusions:    sodium chloride 100 mL/hr Last Rate: 100 mL/hr (10/07/18 0555)       PRN Meds:  •  ondansetron  •  sodium chloride  •  sodium chloride    Home Meds:  Birth control pills    ROS: Review of Systems   Constitutional: Negative.  Negative for activity change, appetite change, chills, diaphoresis, fatigue and fever.   HENT: Negative.  Negative for mouth sores, nosebleeds and trouble swallowing.    Eyes: Negative.    Respiratory: Negative.  Negative for cough, chest tightness, shortness of breath, wheezing and stridor.    Cardiovascular: Negative for chest pain.   Gastrointestinal: Positive for abdominal pain, constipation, nausea and vomiting. Negative for abdominal distention, blood in stool and diarrhea.   Endocrine: Negative.    Genitourinary: Negative.  Negative for difficulty urinating and dysuria.   Musculoskeletal: Negative.  Negative for arthralgias, back pain, gait problem, joint swelling and myalgias.   Skin: Negative.  Negative for color change, pallor and rash.   Allergic/Immunologic: Negative.    Neurological: Negative.  Negative for tremors, seizures, syncope, weakness, light-headedness, numbness and headaches.   Hematological: Negative.  Negative for adenopathy. Does not bruise/bleed easily.   Psychiatric/Behavioral: Negative.  Negative for agitation and  "behavioral problems.   All other systems reviewed and are negative.      PAST MED HX:  Polycystic ovary syndrome    PAST SURG HX:  History reviewed. No pertinent surgical history.    FAM HX:  History reviewed. No pertinent family history.    SOC HX:  Social History     Social History   • Marital status: Single     Spouse name: N/A   • Number of children: N/A   • Years of education: N/A     Occupational History   • Not on file.     Social History Main Topics   • Smoking status: Never Smoker   • Smokeless tobacco: Not on file   • Alcohol use No   • Drug use: No   • Sexual activity: Not on file     Other Topics Concern   • Not on file     Social History Narrative   • No narrative on file       PHYSICAL EXAM  /61 (BP Location: Right arm, Patient Position: Lying)   Pulse 68   Temp 98.6 °F (37 °C) (Oral)   Resp 17   Ht 170.2 cm (67\")   Wt 103 kg (226 lb 1.6 oz)   LMP 09/27/2018 Comment: neg hcg 10/6/2018  SpO2 100%   BMI 35.41 kg/m²    Wt Readings from Last 3 Encounters:   10/07/18 103 kg (226 lb 1.6 oz)   ,body mass index is 35.41 kg/m².  Physical Exam   Constitutional: She is oriented to person, place, and time. She appears well-developed and well-nourished.   HENT:   Head: Normocephalic.   Eyes: Conjunctivae are normal. Scleral icterus is present.   Neck: Normal range of motion.   Cardiovascular: Normal rate and regular rhythm.    No murmur heard.  Pulmonary/Chest: Effort normal and breath sounds normal. No respiratory distress. She has no wheezes. She has no rales.   Abdominal: Soft. Bowel sounds are normal. She exhibits no distension and no mass. There is no guarding.   +Mild tenderness RUQ   Genitourinary:   Genitourinary Comments: Deferred   Musculoskeletal: Normal range of motion. She exhibits no edema.   Neurological: She is alert and oriented to person, place, and time.   Skin: Skin is warm and dry. Capillary refill takes less than 2 seconds. No rash noted.   Psychiatric: She has a normal mood and " affect. Her behavior is normal.   Nursing note and vitals reviewed.      Results Review:   I reviewed the patient's new clinical results.    Lab Results   Component Value Date    WBC 6.03 10/07/2018    HGB 11.9 10/07/2018    HGB 13.2 10/06/2018    HGB 13.7 10/05/2018    HCT 37.5 10/07/2018    MCV 85.2 10/07/2018     10/07/2018       No results found for: INR    Lab Results   Component Value Date    GLUCOSE 96 10/07/2018    BUN <5 (L) 10/07/2018    CREATININE 0.65 10/07/2018    EGFRIFNONA 114 10/07/2018    BCR  10/07/2018      Comment:      Unable to calculate Bun/Crea Ratio.    CO2 26.0 10/07/2018    CALCIUM 8.7 10/07/2018    ALBUMIN 3.70 10/07/2018    ALKPHOS 185 (H) 10/07/2018    BILITOT 4.6 (H) 10/07/2018    BILIDIR 2.8 (H) 10/05/2018     (H) 10/07/2018     (H) 10/07/2018       ASSESSMENTS/PLANS    1. Choledocholithiasis with obstruction. Personal review of MRCP films shows dilated biliary tree and filling defect in distal CBD.  2. Cholestatic LFT elevations.  3. Nausea and vomiting, resolved.  4. RUQ pain, controlled with opiates.    >> ERCP today.  >> Consult surgery for cholecystectomy.    Reviewed risk and benefit of ERCP with the patient, including risk of bleeding, bowel perforation, pancreatitis, and death. Discussed risk of leaving stone in the bile duct, including further pain, bile duct infection, and pancreatitis. Patient wishes to proceed with ERCP.    I discussed the patients findings and my recommendations with patient, family, and Dr. Mccall.    Mark I. Brunner, MD  10/07/18  9:30 AM      Electronically signed by Brunner, Mark I, MD at 10/7/2018 10:13 AM

## 2018-10-08 NOTE — PLAN OF CARE
Problem: Patient Care Overview  Goal: Plan of Care Review  Outcome: Ongoing (interventions implemented as appropriate)   10/08/18 0417   Coping/Psychosocial   Plan of Care Reviewed With patient   Plan of Care Review   Progress no change   OTHER   Outcome Summary pt VSS, vomiting x1 tonight with pain in epigastric area, prn meds given, meds controlled symptoms      Goal: Individualization and Mutuality  Outcome: Ongoing (interventions implemented as appropriate)    Goal: Discharge Needs Assessment  Outcome: Ongoing (interventions implemented as appropriate)    Goal: Interprofessional Rounds/Family Conf  Outcome: Ongoing (interventions implemented as appropriate)      Problem: Pain, Acute (Adult)  Goal: Acceptable Pain Control/Comfort Level  Outcome: Ongoing (interventions implemented as appropriate)

## 2018-10-08 NOTE — PROGRESS NOTES
"    [facility] Medicine Services  PROGRESS NOTE    Patient Name: Piyush Wren  : 1995  MRN: [id number]    Date of Admission: 10/6/2018  Length of Stay: 1  Primary Care Physician: Provider, No Known    Subjective   Subjective     CC:  Abdominal pain    HPI:  Pt doing okay this am, but was still nauseated despite Zofran earlier this am- has since received IV Phenergan and nausea is better.  Still with abdominal pain but not \"as bad\" as on admission.  Unable to tolerate much PO intake.     Review of Systems  Gen- No fevers, chills  CV- No chest pain, palpitations  Resp- No cough, dyspnea  GI- as above    Otherwise ROS is negative except as mentioned in the HPI.    Objective   Objective     Vital Signs:   Temp:  [97.4 °F (36.3 °C)-98.5 °F (36.9 °C)] 97.4 °F (36.3 °C)  Heart Rate:  [64-97] 75  Resp:  [15-18] 16  BP: (108-149)/(56-87) 132/77        Physical Exam:  Constitutional: No acute distress, awake, alert, obese WF  HENT: NCAT, mucous membranes moist  Respiratory: Clear to auscultation bilaterally, respiratory effort normal   Cardiovascular: RRR, no murmurs, rubs, or gallops, palpable pedal pulses bilaterally  Gastrointestinal: mildly tender to light palpation, + BS, ND  Musculoskeletal: No bilateral ankle edema  Psychiatric: Appropriate affect, cooperative  Neurologic: Oriented x 3, strength symmetric in all extremities, Cranial Nerves grossly intact to confrontation, speech clear  Skin: No rashes  Results Reviewed:  I have personally reviewed current lab, radiology, and data and agree.      Results from last 7 days  Lab Units 10/08/18  0600 10/07/18  0609 10/06/18  2356   WBC 10*3/mm3 11.40* 6.03 8.17   HEMOGLOBIN g/dL 11.9 11.9 13.2   HEMATOCRIT % 37.3 37.5 40.0   PLATELETS 10*3/mm3 304 312 328       Results from last 7 days  Lab Units 10/08/18  0600 10/07/18  0609 10/06/18  2229   SODIUM mmol/L 136 137 137   POTASSIUM mmol/L 4.0 4.0 3.7   CHLORIDE mmol/L 102 104 106   CO2 " mmol/L 25.0 26.0 22.0   BUN mg/dL 6* <5* <5*   CREATININE mg/dL 0.65 0.65 0.69   GLUCOSE mg/dL 80 96 99   CALCIUM mg/dL 8.7 8.7 9.5   ALT (SGPT) U/L 246* 269* 316*   AST (SGOT) U/L 155* 128* 172*     Estimated Creatinine Clearance: 167.6 mL/min (by C-G formula based on SCr of 0.65 mg/dL).  No results found for: BNP    Microbiology Results Abnormal     None          Imaging Results (last 24 hours)     Procedure Component Value Units Date/Time    FL ERCP pancreatic and biliary ducts [[id number]] Collected:  10/07/18 1540     Updated:  10/07/18 1850    Narrative:       EXAMINATION: FL ERCP PANCREATIC AND BILIARY DUCTS - 10/7/2018     INDICATION: R74.8-Abnormal levels of other serum enzymes;  K80.51-Calculus of bile duct without cholangitis or cholecystitis with  obstruction.     TECHNIQUE:  Fluoroscopic images were provided for a clinical service  without radiologist present.  The fluoroscopy time was 14 minutes 5  seconds. 4 spot images were provided.     COMPARISONS:  MRCP 7 hours.       Impression:       FINDINGS/IMPRESSION: Imaging provided for ERCP.  See procedure note for  details.     DICTATED:   10/7/2018  EDITED/ls :   10/7/2018         This report was finalized on 10/7/2018 6:48 PM by Preet Luis.       MRI abdomen wo contrast mrcp [[id number]] Collected:  10/07/18 1149     Updated:  10/07/18 1450    Narrative:       EXAMINATION: MRI ABDOMEN WO CONTRAST MRCP - 10/7/2018     INDICATION: R74.8-Abnormal levels of other serum enzymes.     TECHNIQUE:  Noncontrast MRI of the abdomen was performed utilizing MRCP  protocol.     COMPARISONS:  CT abdomen/pelvis 4 hours prior.     FINDINGS:  Marked intrahepatic and extrahepatic biliary ductal  dilatation is seen with a stone in the mid common bile duct. There are  stones in the distended gallbladder. No pericholecystic inflammatory  change. There is normal background signal without steatosis or  cirrhosis. No focal lesion is seen on the T2-weighted series. Pancreas  is  normal without evidence of ductal dilatation or divisum. The adrenal  glands, kidneys and spleen are unremarkable. No dilated small or large  bowel is seen in the imaged field of view. No enlarged lymph nodes or  free fluid is seen in the image field of view.       Impression:       1. Obstructing calculus in the common bile duct causing intrahepatic and  extrahepatic biliary ductal dilatation.  2. Cholelithiasis without evidence of acute cholecystitis.     DICTATED:   10/07/2018  EDITED/ls :   10/07/2018      This report was finalized on 10/7/2018 2:48 PM by Preet Luis.                I have reviewed the medications.      pantoprazole 40 mg Intravenous Q AM   sodium chloride 3 mL Intravenous Q12H         Assessment/Plan   Assessment / Plan     Hospital Problem List     * (Principal)Choledocholithiasis    Elevated liver enzymes    Hyperbilirubinemia    Cholelithiasis    Intractable abdominal pain    Jaundice             Brief Hospital Course to date:  Piyush Wren is a 23 y.o. female with no PMH presents with abdominal pain- found to have elevated LFTs and hyperbilirubinemia in the ED.  CT A/P showed ductal dilatation and MRCP revealed obstructing stone in CBD.  She was admitted to our service and GI was consulted.       Assessment & Plan:  --Choledocholithiasis- s/p ERCP with stent placement, GI unable to remove stone.  General Surgery consulted, deferring surgical intervention for time being.  Await GI recs.  --Elevated LFTs and hyperbilirubinemia-due to above, improving today. Continue to monitor.     DVT Prophylaxis:  mechanical    CODE STATUS:   Code Status and Medical Interventions:   Ordered at: 10/07/18 0238     Level Of Support Discussed With:    Patient     Code Status:    CPR     Medical Interventions (Level of Support Prior to Arrest):    Full       Disposition: I expect the patient to be discharged TBD      Electronically signed by Sivan Montoya MD, 10/08/18, 11:04 AM.

## 2018-10-09 ENCOUNTER — APPOINTMENT (OUTPATIENT)
Dept: ULTRASOUND IMAGING | Facility: HOSPITAL | Age: 23
End: 2018-10-09

## 2018-10-09 LAB
ALBUMIN SERPL-MCNC: 3.22 G/DL (ref 3.2–4.8)
ALBUMIN/GLOB SERPL: 1.7 G/DL (ref 1.5–2.5)
ALP SERPL-CCNC: 134 U/L (ref 25–100)
ALT SERPL W P-5'-P-CCNC: 317 U/L (ref 7–40)
ANION GAP SERPL CALCULATED.3IONS-SCNC: 8 MMOL/L (ref 3–11)
AST SERPL-CCNC: 193 U/L (ref 0–33)
BILIRUB SERPL-MCNC: 1.6 MG/DL (ref 0.3–1.2)
BUN BLD-MCNC: 6 MG/DL (ref 9–23)
BUN/CREAT SERPL: 11.5 (ref 7–25)
CALCIUM SPEC-SCNC: 8 MG/DL (ref 8.7–10.4)
CHLORIDE SERPL-SCNC: 107 MMOL/L (ref 99–109)
CO2 SERPL-SCNC: 23 MMOL/L (ref 20–31)
CREAT BLD-MCNC: 0.52 MG/DL (ref 0.6–1.3)
DEPRECATED RDW RBC AUTO: 43.2 FL (ref 37–54)
ERYTHROCYTE [DISTWIDTH] IN BLOOD BY AUTOMATED COUNT: 13.8 % (ref 11.3–14.5)
GFR SERPL CREATININE-BSD FRML MDRD: 147 ML/MIN/1.73
GLOBULIN UR ELPH-MCNC: 1.9 GM/DL
GLUCOSE BLD-MCNC: 81 MG/DL (ref 70–100)
HCT VFR BLD AUTO: 35.3 % (ref 34.5–44)
HGB BLD-MCNC: 11.4 G/DL (ref 11.5–15.5)
LIPASE SERPL-CCNC: 555 U/L (ref 6–51)
MCH RBC QN AUTO: 27.8 PG (ref 27–31)
MCHC RBC AUTO-ENTMCNC: 32.3 G/DL (ref 32–36)
MCV RBC AUTO: 86.1 FL (ref 80–99)
PLATELET # BLD AUTO: 261 10*3/MM3 (ref 150–450)
PMV BLD AUTO: 9.6 FL (ref 6–12)
POTASSIUM BLD-SCNC: 3.5 MMOL/L (ref 3.5–5.5)
PROT SERPL-MCNC: 5.1 G/DL (ref 5.7–8.2)
RBC # BLD AUTO: 4.1 10*6/MM3 (ref 3.89–5.14)
SODIUM BLD-SCNC: 138 MMOL/L (ref 132–146)
WBC NRBC COR # BLD: 16 10*3/MM3 (ref 3.5–10.8)

## 2018-10-09 PROCEDURE — 99232 SBSQ HOSP IP/OBS MODERATE 35: CPT | Performed by: PHYSICIAN ASSISTANT

## 2018-10-09 PROCEDURE — 76705 ECHO EXAM OF ABDOMEN: CPT

## 2018-10-09 PROCEDURE — 83690 ASSAY OF LIPASE: CPT | Performed by: PHYSICIAN ASSISTANT

## 2018-10-09 PROCEDURE — 99233 SBSQ HOSP IP/OBS HIGH 50: CPT | Performed by: HOSPITALIST

## 2018-10-09 PROCEDURE — 25010000002 HYDROMORPHONE PER 4 MG: Performed by: INTERNAL MEDICINE

## 2018-10-09 PROCEDURE — 85027 COMPLETE CBC AUTOMATED: CPT | Performed by: INTERNAL MEDICINE

## 2018-10-09 PROCEDURE — 80053 COMPREHEN METABOLIC PANEL: CPT | Performed by: INTERNAL MEDICINE

## 2018-10-09 RX ORDER — SODIUM CHLORIDE 9 MG/ML
150 INJECTION, SOLUTION INTRAVENOUS CONTINUOUS
Status: ACTIVE | OUTPATIENT
Start: 2018-10-09 | End: 2018-10-10

## 2018-10-09 RX ADMIN — PANTOPRAZOLE SODIUM 40 MG: 40 INJECTION, POWDER, FOR SOLUTION INTRAVENOUS at 05:32

## 2018-10-09 RX ADMIN — HYDROMORPHONE HYDROCHLORIDE 0.25 MG: 1 INJECTION, SOLUTION INTRAMUSCULAR; INTRAVENOUS; SUBCUTANEOUS at 05:32

## 2018-10-09 RX ADMIN — HYDROMORPHONE HYDROCHLORIDE 0.25 MG: 1 INJECTION, SOLUTION INTRAMUSCULAR; INTRAVENOUS; SUBCUTANEOUS at 14:09

## 2018-10-09 RX ADMIN — Medication 3 ML: at 11:08

## 2018-10-09 RX ADMIN — HYDROMORPHONE HYDROCHLORIDE 0.25 MG: 1 INJECTION, SOLUTION INTRAMUSCULAR; INTRAVENOUS; SUBCUTANEOUS at 09:13

## 2018-10-09 RX ADMIN — HYDROMORPHONE HYDROCHLORIDE 0.25 MG: 1 INJECTION, SOLUTION INTRAMUSCULAR; INTRAVENOUS; SUBCUTANEOUS at 00:37

## 2018-10-09 RX ADMIN — BISACODYL 10 MG: 10 SUPPOSITORY RECTAL at 09:13

## 2018-10-09 RX ADMIN — SODIUM CHLORIDE 150 ML/HR: 9 INJECTION, SOLUTION INTRAVENOUS at 15:14

## 2018-10-09 RX ADMIN — HYDROMORPHONE HYDROCHLORIDE 0.25 MG: 1 INJECTION, SOLUTION INTRAMUSCULAR; INTRAVENOUS; SUBCUTANEOUS at 20:01

## 2018-10-09 RX ADMIN — Medication 3 ML: at 20:17

## 2018-10-09 RX ADMIN — SODIUM CHLORIDE 150 ML/HR: 9 INJECTION, SOLUTION INTRAVENOUS at 11:09

## 2018-10-09 NOTE — PROGRESS NOTES
[facility] Medicine Services  PROGRESS NOTE    Patient Name: Piyush Wren  : 1995  MRN: [id number]    Date of Admission: 10/6/2018  Length of Stay: 2  Primary Care Physician: Provider, No Known    Subjective   Subjective     CC:  Abdominal pain    HPI:  Looks better than last time I saw.  Mother in room today.   Patient says having abdominal pain.      Review of Systems  Gen- No fevers, chills  CV- No chest pain, palpitations  Resp- No cough, dyspnea  GI- as above    Otherwise ROS is negative except as mentioned in the HPI.    Objective   Objective     Vital Signs:   Temp:  [98.2 °F (36.8 °C)-98.7 °F (37.1 °C)] 98.7 °F (37.1 °C)  Heart Rate:  [81-89] 86  Resp:  [16-18] 18  BP: (115-134)/(64-73) 115/64        Physical Exam:  Constitutional: No acute distress, awake, alert, obese WF  HENT: NCAT, mucous membranes moist  Respiratory: Clear to auscultation bilaterally, respiratory effort normal   Cardiovascular: RRR, no murmurs, rubs, or gallops, palpable pedal pulses bilaterally  Gastrointestinal: mildly tender to light palpation, + BS, ND  Musculoskeletal: No bilateral ankle edema  Psychiatric: Appropriate affect, cooperative  Neurologic: Oriented x 3, strength symmetric in all extremities, Cranial Nerves grossly intact to confrontation, speech clear  Skin: No rashes  Results Reviewed:  I have personally reviewed current lab, radiology, and data and agree.      Results from last 7 days  Lab Units 10/09/18  0657 10/08/18  0600 10/07/18  06   WBC 10*3/mm3 16.00* 11.40* 6.03   HEMOGLOBIN g/dL 11.4* 11.9 11.9   HEMATOCRIT % 35.3 37.3 37.5   PLATELETS 10*3/mm3 261 304 312       Results from last 7 days  Lab Units 10/09/18  0657 10/08/18  0600 10/07/18  06   SODIUM mmol/L 138 136 137   POTASSIUM mmol/L 3.5 4.0 4.0   CHLORIDE mmol/L 107 102 104   CO2 mmol/L 23.0 25.0 26.0   BUN mg/dL 6* 6* <5*   CREATININE mg/dL 0.52* 0.65 0.65   GLUCOSE mg/dL 81 80 96   CALCIUM mg/dL 8.0* 8.7 8.7    ALT (SGPT) U/L 317* 246* 269*   AST (SGOT) U/L 193* 155* 128*     Estimated Creatinine Clearance: 209.5 mL/min (A) (by C-G formula based on SCr of 0.52 mg/dL (L)).  No results found for: BNP    Microbiology Results Abnormal     None          Imaging Results (last 24 hours)     ** No results found for the last 24 hours. **             I have reviewed the medications.      bisacodyl 10 mg Rectal Daily   pantoprazole 40 mg Intravenous Q AM   sodium chloride 3 mL Intravenous Q12H         Assessment/Plan   Assessment / Plan     Active Hospital Problems    Diagnosis   • **Choledocholithiasis   • Elevated liver enzymes   • Hyperbilirubinemia   • Cholelithiasis   • Intractable abdominal pain   • Jaundice          Brief Hospital Course to date:  Piyush Wren is a 23 y.o. female with no PMH presents with abdominal pain- found to have elevated LFTs and hyperbilirubinemia in the ED.  CT A/P showed ductal dilatation and MRCP revealed obstructing stone in CBD.  She was admitted to our service and GI was consulted.       Assessment & Plan:  --Choledocholithiasis- s/p ERCP with stent placement, GI unable to remove stone.  General Surgery consulted, deferring surgical intervention for time being.  --Elevated LFTs and hyperbilirubinemia-due to above  --repeat Gallbladder US today     DVT Prophylaxis:  mechanical    CODE STATUS:   Code Status and Medical Interventions:   Ordered at: 10/07/18 0238     Level Of Support Discussed With:    Patient     Code Status:    CPR     Medical Interventions (Level of Support Prior to Arrest):    Full       Disposition: I expect the patient to be discharged TBD      Electronically signed by Oliverio Reinoso MD, 10/09/18, 12:16 PM.

## 2018-10-09 NOTE — PROGRESS NOTES
Continued Stay Note  [facility]     Patient Name: Piyush Wren  MRN: [id number]  Today's Date: 10/9/2018    Admit Date: 10/6/2018          Discharge Plan     Row Name 10/09/18 1236       Plan    Plan Home    Patient/Family in Agreement with Plan yes    Plan Comments Spoke with Ms. Wren. Her plan is to return home with her mother at discharge.  No anticipated needs. PCP appointment scheduled and is in AVS.     Final Discharge Disposition Code 01 - home or self-care              Discharge Codes    No documentation.       Expected Discharge Date and Time     Expected Discharge Date Expected Discharge Time    Oct 10, 2018             Vanna Barnhart RN

## 2018-10-09 NOTE — PROGRESS NOTES
"GI Daily Progress Note  Subjective:    Chief Complaint:  Choledocholithiasis with cholelithiasis     Patient states she feels improved today from yesterday.   Still however with nausea and intermittent abdominal pain.   Little po intake.  No emesis.  Afebrile.   Had small bowel movement yesterday     Objective:    /64   Pulse 86   Temp 98.7 °F (37.1 °C) (Oral)   Resp 18   Ht 170.2 cm (67\")   Wt 103 kg (226 lb 1.6 oz)   LMP 09/27/2018 Comment: neg hcg 10/6/2018  SpO2 97%   BMI 35.41 kg/m²     Physical Exam   Constitutional: She is oriented to person, place, and time. She appears well-developed and well-nourished. No distress.   HENT:   Head: Normocephalic and atraumatic.   Cardiovascular: Normal rate and regular rhythm.    Pulmonary/Chest: Effort normal. No respiratory distress.   Abdominal: Soft. Bowel sounds are normal. She exhibits no distension.   Minimal tenderness to palpation of the right upper quadrant and epigastric region.   No guarding.   No peritoneal signs.     Neurological: She is alert and oriented to person, place, and time.   Skin: Skin is warm and dry.   Psychiatric: She has a normal mood and affect. Her behavior is normal.       Lab  Lab Results   Component Value Date    WBC 16.00 (H) 10/09/2018    HGB 11.4 (L) 10/09/2018    HGB 11.9 10/08/2018    HGB 11.9 10/07/2018    MCV 86.1 10/09/2018     10/09/2018       Lab Results   Component Value Date    GLUCOSE 81 10/09/2018    BUN 6 (L) 10/09/2018    CREATININE 0.52 (L) 10/09/2018    EGFRIFNONA 147 10/09/2018    BCR 11.5 10/09/2018    CO2 23.0 10/09/2018    CALCIUM 8.0 (L) 10/09/2018    ALBUMIN 3.22 10/09/2018    ALKPHOS 134 (H) 10/09/2018    BILITOT 1.6 (H) 10/09/2018    BILIDIR 2.8 (H) 10/05/2018     (H) 10/09/2018     (H) 10/09/2018       Assessment:    Choledocholithiasis, s/p ERCP showing large obstructing stone unable to be removed.  Temporary stent placed  Cholelithiasis   Cholestatic hepatitis, secondary to " obstruction, Bili improving.  Transaminases slightly worse  Abdominal pain  N/V post ERCP  Constipation    Plan:    >>> Leukocytosis and slight worsening in transaminases noted.  Patient is afebrile.  Previous u/s did not reveal cholecystitis but will repeat ultrasound today  >>> Check lipase     Further recommendations based on results of above.      VÍCTOR Newberry  10/09/18  9:31 AM    Mild post ERCP pancreatitis.  US pending.  Continue clear liq diet and advance in am if she continues to improve.  Discussed with her mother and her uncle via phone that is a Resident

## 2018-10-09 NOTE — PLAN OF CARE
Problem: Patient Care Overview  Goal: Plan of Care Review  Outcome: Ongoing (interventions implemented as appropriate)   10/09/18 0311   Coping/Psychosocial   Plan of Care Reviewed With patient   Plan of Care Review   Progress improving   OTHER   Outcome Summary pt n/v beter tonight. pain controlled with prn meds, rested well      Goal: Individualization and Mutuality  Outcome: Ongoing (interventions implemented as appropriate)    Goal: Discharge Needs Assessment  Outcome: Ongoing (interventions implemented as appropriate)    Goal: Interprofessional Rounds/Family Conf  Outcome: Ongoing (interventions implemented as appropriate)      Problem: Pain, Acute (Adult)  Goal: Acceptable Pain Control/Comfort Level  Outcome: Ongoing (interventions implemented as appropriate)

## 2018-10-10 ENCOUNTER — PREP FOR SURGERY (OUTPATIENT)
Dept: OTHER | Facility: HOSPITAL | Age: 23
End: 2018-10-10

## 2018-10-10 ENCOUNTER — APPOINTMENT (OUTPATIENT)
Dept: NUCLEAR MEDICINE | Facility: HOSPITAL | Age: 23
End: 2018-10-10

## 2018-10-10 DIAGNOSIS — K80.51 CALCULUS OF BILE DUCT WITHOUT CHOLANGITIS WITH OBSTRUCTION: Primary | ICD-10-CM

## 2018-10-10 LAB
ALBUMIN SERPL-MCNC: 3.38 G/DL (ref 3.2–4.8)
ALP SERPL-CCNC: 135 U/L (ref 25–100)
ALT SERPL W P-5'-P-CCNC: 274 U/L (ref 7–40)
ANION GAP SERPL CALCULATED.3IONS-SCNC: 10 MMOL/L (ref 3–11)
AST SERPL-CCNC: 112 U/L (ref 0–33)
BILIRUB CONJ SERPL-MCNC: 0.8 MG/DL (ref 0–0.2)
BILIRUB INDIRECT SERPL-MCNC: 0.6 MG/DL (ref 0.1–1.1)
BILIRUB SERPL-MCNC: 1.4 MG/DL (ref 0.3–1.2)
BUN BLD-MCNC: <5 MG/DL (ref 9–23)
BUN/CREAT SERPL: ABNORMAL (ref 7–25)
CALCIUM SPEC-SCNC: 8.5 MG/DL (ref 8.7–10.4)
CHLORIDE SERPL-SCNC: 102 MMOL/L (ref 99–109)
CO2 SERPL-SCNC: 22 MMOL/L (ref 20–31)
CREAT BLD-MCNC: 0.54 MG/DL (ref 0.6–1.3)
DEPRECATED RDW RBC AUTO: 42.7 FL (ref 37–54)
ERYTHROCYTE [DISTWIDTH] IN BLOOD BY AUTOMATED COUNT: 13.9 % (ref 11.3–14.5)
GFR SERPL CREATININE-BSD FRML MDRD: 141 ML/MIN/1.73
GLUCOSE BLD-MCNC: 73 MG/DL (ref 70–100)
HCT VFR BLD AUTO: 34 % (ref 34.5–44)
HGB BLD-MCNC: 10.8 G/DL (ref 11.5–15.5)
LIPASE SERPL-CCNC: 95 U/L (ref 6–51)
MCH RBC QN AUTO: 27 PG (ref 27–31)
MCHC RBC AUTO-ENTMCNC: 31.8 G/DL (ref 32–36)
MCV RBC AUTO: 85 FL (ref 80–99)
PLATELET # BLD AUTO: 276 10*3/MM3 (ref 150–450)
PMV BLD AUTO: 9.9 FL (ref 6–12)
POTASSIUM BLD-SCNC: 3.3 MMOL/L (ref 3.5–5.5)
POTASSIUM BLD-SCNC: 3.9 MMOL/L (ref 3.5–5.5)
PROT SERPL-MCNC: 5.8 G/DL (ref 5.7–8.2)
RBC # BLD AUTO: 4 10*6/MM3 (ref 3.89–5.14)
SODIUM BLD-SCNC: 134 MMOL/L (ref 132–146)
WBC NRBC COR # BLD: 16.18 10*3/MM3 (ref 3.5–10.8)

## 2018-10-10 PROCEDURE — 99232 SBSQ HOSP IP/OBS MODERATE 35: CPT | Performed by: HOSPITALIST

## 2018-10-10 PROCEDURE — 83690 ASSAY OF LIPASE: CPT | Performed by: INTERNAL MEDICINE

## 2018-10-10 PROCEDURE — 78226 HEPATOBILIARY SYSTEM IMAGING: CPT

## 2018-10-10 PROCEDURE — 0 TECHNETIUM TC 99M MEBROFENIN KIT: Performed by: HOSPITALIST

## 2018-10-10 PROCEDURE — 25010000002 MORPHINE PER 10 MG: Performed by: RADIOLOGY

## 2018-10-10 PROCEDURE — A9537 TC99M MEBROFENIN: HCPCS | Performed by: HOSPITALIST

## 2018-10-10 PROCEDURE — 85027 COMPLETE CBC AUTOMATED: CPT | Performed by: INTERNAL MEDICINE

## 2018-10-10 PROCEDURE — 84132 ASSAY OF SERUM POTASSIUM: CPT | Performed by: HOSPITALIST

## 2018-10-10 PROCEDURE — 99232 SBSQ HOSP IP/OBS MODERATE 35: CPT | Performed by: INTERNAL MEDICINE

## 2018-10-10 PROCEDURE — 25010000002 HYDROMORPHONE PER 4 MG: Performed by: INTERNAL MEDICINE

## 2018-10-10 PROCEDURE — 80048 BASIC METABOLIC PNL TOTAL CA: CPT | Performed by: INTERNAL MEDICINE

## 2018-10-10 PROCEDURE — 80076 HEPATIC FUNCTION PANEL: CPT | Performed by: INTERNAL MEDICINE

## 2018-10-10 PROCEDURE — 25010000002 HYDROMORPHONE 1 MG/ML SOLUTION: Performed by: INTERNAL MEDICINE

## 2018-10-10 PROCEDURE — 25010000002 MEROPENEM: Performed by: INTERNAL MEDICINE

## 2018-10-10 RX ORDER — POTASSIUM CHLORIDE 7.45 MG/ML
10 INJECTION INTRAVENOUS
Status: DISCONTINUED | OUTPATIENT
Start: 2018-10-10 | End: 2018-10-14 | Stop reason: HOSPADM

## 2018-10-10 RX ORDER — MAGNESIUM SULFATE HEPTAHYDRATE 40 MG/ML
2 INJECTION, SOLUTION INTRAVENOUS AS NEEDED
Status: DISCONTINUED | OUTPATIENT
Start: 2018-10-10 | End: 2018-10-14 | Stop reason: HOSPADM

## 2018-10-10 RX ORDER — POTASSIUM CHLORIDE 1.5 G/1.77G
40 POWDER, FOR SOLUTION ORAL AS NEEDED
Status: DISCONTINUED | OUTPATIENT
Start: 2018-10-10 | End: 2018-10-14 | Stop reason: HOSPADM

## 2018-10-10 RX ORDER — MORPHINE SULFATE 4 MG/ML
4 INJECTION, SOLUTION INTRAMUSCULAR; INTRAVENOUS
Status: COMPLETED | OUTPATIENT
Start: 2018-10-10 | End: 2018-10-10

## 2018-10-10 RX ORDER — KIT FOR THE PREPARATION OF TECHNETIUM TC 99M MEBROFENIN 45 MG/10ML
1 INJECTION, POWDER, LYOPHILIZED, FOR SOLUTION INTRAVENOUS
Status: COMPLETED | OUTPATIENT
Start: 2018-10-10 | End: 2018-10-10

## 2018-10-10 RX ORDER — POTASSIUM CHLORIDE 750 MG/1
40 CAPSULE, EXTENDED RELEASE ORAL AS NEEDED
Status: DISCONTINUED | OUTPATIENT
Start: 2018-10-10 | End: 2018-10-14 | Stop reason: HOSPADM

## 2018-10-10 RX ORDER — MAGNESIUM SULFATE HEPTAHYDRATE 40 MG/ML
4 INJECTION, SOLUTION INTRAVENOUS AS NEEDED
Status: DISCONTINUED | OUTPATIENT
Start: 2018-10-10 | End: 2018-10-14 | Stop reason: HOSPADM

## 2018-10-10 RX ADMIN — MEROPENEM 1 G: 1 INJECTION, POWDER, FOR SOLUTION INTRAVENOUS at 18:32

## 2018-10-10 RX ADMIN — SODIUM CHLORIDE 150 ML/HR: 9 INJECTION, SOLUTION INTRAVENOUS at 10:27

## 2018-10-10 RX ADMIN — MORPHINE SULFATE 4 MG: 4 INJECTION, SOLUTION INTRAMUSCULAR; INTRAVENOUS at 14:55

## 2018-10-10 RX ADMIN — HYDROMORPHONE HYDROCHLORIDE 0.25 MG: 1 INJECTION, SOLUTION INTRAMUSCULAR; INTRAVENOUS; SUBCUTANEOUS at 04:00

## 2018-10-10 RX ADMIN — PANTOPRAZOLE SODIUM 40 MG: 40 INJECTION, POWDER, FOR SOLUTION INTRAVENOUS at 05:25

## 2018-10-10 RX ADMIN — MEBROFENIN 1 DOSE: 45 INJECTION, POWDER, LYOPHILIZED, FOR SOLUTION INTRAVENOUS at 13:20

## 2018-10-10 RX ADMIN — POTASSIUM CHLORIDE 40 MEQ: 750 CAPSULE, EXTENDED RELEASE ORAL at 16:04

## 2018-10-10 RX ADMIN — SODIUM CHLORIDE 150 ML/HR: 9 INJECTION, SOLUTION INTRAVENOUS at 17:21

## 2018-10-10 RX ADMIN — Medication 3 ML: at 20:34

## 2018-10-10 RX ADMIN — HYDROMORPHONE HYDROCHLORIDE 0.25 MG: 1 INJECTION, SOLUTION INTRAMUSCULAR; INTRAVENOUS; SUBCUTANEOUS at 21:50

## 2018-10-10 RX ADMIN — POTASSIUM CHLORIDE 40 MEQ: 750 CAPSULE, EXTENDED RELEASE ORAL at 07:05

## 2018-10-10 NOTE — PROGRESS NOTES
"GI Daily Progress Note  Subjective     Piyush Wren is a 23 y.o. female who was admitted with Choledocholithiasis. Feels better this morning.  No pain meds since last night.  No N/V  Mom in room  Chief Complaint: abd pain    Objective     /55   Pulse 94   Temp 98.6 °F (37 °C) (Oral)   Resp 18   Ht 170.2 cm (67\")   Wt 103 kg (226 lb 1.6 oz)   LMP 09/27/2018 Comment: neg hcg 10/6/2018  SpO2 97%   BMI 35.41 kg/m²     Intake/Output last 3 shifts:  I/O last 3 completed shifts:  In: 2665 [I.V.:2665]  Out: 3000 [Urine:2600; Emesis/NG output:400]  Intake/Output this shift:  I/O this shift:  In: 2690 [I.V.:2690]  Out: -       Physical Exam  Wt Readings from Last 3 Encounters:   10/07/18 103 kg (226 lb 1.6 oz)   ,body mass index is 35.41 kg/m².,@FLOWAMB(6)@,@FLOWAMB(5)@,@FLOWAMB(8)@   CONSTITUTIONAL:  Resp CTA; no rhonchi, rales, or wheezes.  Respiration effort normal  CV RRR; no M/R/G. No lower extremity edema  GI Abd soft, minimal RUQ pain, ND, normal active bowel sounds.    Psych: Awake and alert    DATA:      US per radiologist report:  IMPRESSION:  1. Cholelithiasis in a mildly contracted gallbladder now with  gallbladder wall thickening up to 7 mm concerning for acute  cholecystitis.  2. Common bile duct within normal limits.  3. No ascites.        Results for PIYUSH WREN (MRN [id number]) as of 10/10/2018 09:43   Ref. Range 10/6/2018 22:29 10/7/2018 06:09 10/8/2018 06:00 10/9/2018 06:57 10/10/2018 03:55   eGFR Non African Amer Latest Ref Range: >60 mL/min/1.73 106 114 114 147 141   Alkaline Phosphatase Latest Ref Range: 25 - 100 U/L 199 (H) 185 (H) 174 (H) 134 (H) 135 (H)   Total Protein Latest Ref Range: 5.7 - 8.2 g/dL 7.4 6.0 5.6 (L) 5.1 (L) 5.8   ALT (SGPT) Latest Ref Range: 7 - 40 U/L 316 (H) 269 (H) 246 (H) 317 (H) 274 (H)   AST (SGOT) Latest Ref Range: 0 - 33 U/L 172 (H) 128 (H) 155 (H) 193 (H) 112 (H)   Total Bilirubin Latest Ref Range: 0.3 - 1.2 mg/dL 4.4 (H) 4.6 (H) 1.9 (H) 1.6 (H) 1.4 " (H)   Results for DANNIE DIAL (MRN [id number]) as of 10/10/2018 09:43   Ref. Range 10/7/2018 06:09 10/8/2018 06:00 10/9/2018 06:57 10/10/2018 03:55   WBC Latest Ref Range: 3.50 - 10.80 10*3/mm3 6.03 11.40 (H) 16.00 (H) 16.18 (H)   RBC Latest Ref Range: 3.89 - 5.14 10*6/mm3 4.40 4.32 4.10 4.00   Hemoglobin Latest Ref Range: 11.5 - 15.5 g/dL 11.9 11.9 11.4 (L) 10.8 (L)   Hematocrit Latest Ref Range: 34.5 - 44.0 % 37.5 37.3 35.3 34.0 (L)         Assessment/Plan       Cholelithiasis  choledocholithiasis  ? Acute cholecystitis  Leucocytosis      US shows thickened GB wall to 7 mm.  She does not clinically have acute cholecystitis.  Will get HIDA.  If it shows acute cholecystitis will add abx and consider CCX now but optimally needs this after repeat ERCP if possible.  WBC may be from pancreatitis which is improving          Choledocholithiasis    Elevated liver enzymes    Hyperbilirubinemia    Cholelithiasis    Intractable abdominal pain    Jaundice       LOS: 3 days     Omar Cunningham MD  10/10/18  9:51 AM

## 2018-10-10 NOTE — PROGRESS NOTES
Continued Stay Note  [facility]     Patient Name: Piyush Wren  MRN: [id number]  Today's Date: 10/10/2018    Admit Date: 10/6/2018          Discharge Plan     Row Name 10/10/18 1216       Plan    Plan Home    Plan Comments HIDA scan pending today. Her plan is to return home with her mother at discharge. No anticipated needs at this time.     Final Discharge Disposition Code 01 - home or self-care              Discharge Codes    No documentation.       Expected Discharge Date and Time     Expected Discharge Date Expected Discharge Time    Oct 11, 2018             Vanna Barnhart RN

## 2018-10-10 NOTE — PLAN OF CARE
Problem: Patient Care Overview  Goal: Plan of Care Review  Outcome: Ongoing (interventions implemented as appropriate)   10/10/18 0434   Coping/Psychosocial   Plan of Care Reviewed With patient   Plan of Care Review   Progress improving   OTHER   Outcome Summary pt denied nausea this shift, did require prn pain medication and voiced relief, WBC now elevated, US yesterday, results pending, VSS

## 2018-10-10 NOTE — PROGRESS NOTES
[facility]  PROGRESS NOTE    Patient Name: Piyush Wren  : 1995  MRN: [id number]    Date of Admission: 10/6/2018  Length of Stay: 3  Primary Care Physician: Provider, No Known    Subjective   Subjective     CC:  Abdominal pain    HPI:  Mother in room today.  Planned for HIDA scan.  Some abdominal pain still in RUQ.  WBC up a little more today.  T - 100 F overnight.    Review of Systems  Gen- No fevers, chills  CV- No chest pain, palpitations  Resp- No cough, dyspnea  GI- as above    Otherwise ROS is negative except as mentioned in the HPI.    Objective   Objective     Vital Signs:   Temp:  [98.6 °F (37 °C)-100 °F (37.8 °C)] 98.6 °F (37 °C)  Heart Rate:  [] 94  Resp:  [18] 18  BP: (109-122)/(55-75) 109/55        Physical Exam:  Constitutional: No acute distress, awake, alert, obese WF  HENT: NCAT, mucous membranes moist  Respiratory: Clear to auscultation bilaterally, respiratory effort normal   Cardiovascular: RRR, no murmurs, rubs, or gallops, palpable pedal pulses bilaterally  Gastrointestinal: mildly tender to light palpation, + BS, ND  Musculoskeletal: No bilateral ankle edema  Psychiatric: Appropriate affect, cooperative  Neurologic: Oriented x 3, strength symmetric in all extremities, Cranial Nerves grossly intact to confrontation, speech clear  Skin: No rashes  Results Reviewed:  I have personally reviewed current lab, radiology, and data and agree.      Results from last 7 days  Lab Units 10/10/18  0355 10/09/18  0657 10/08/18  06   WBC 10*3/mm3 16.18* 16.00* 11.40*   HEMOGLOBIN g/dL 10.8* 11.4* 11.9   HEMATOCRIT % 34.0* 35.3 37.3   PLATELETS 10*3/mm3 276 261 304       Results from last 7 days  Lab Units 10/10/18  0355 10/09/18  0657 10/08/18  0600   SODIUM mmol/L 134 138 136   POTASSIUM mmol/L 3.3* 3.5 4.0   CHLORIDE mmol/L 102 107 102   CO2 mmol/L 22.0 23.0 25.0   BUN mg/dL <5* 6* 6*   CREATININE mg/dL 0.54* 0.52* 0.65   GLUCOSE mg/dL 73 81  80   CALCIUM mg/dL 8.5* 8.0* 8.7   ALT (SGPT) U/L 274* 317* 246*   AST (SGOT) U/L 112* 193* 155*     Estimated Creatinine Clearance: 201.7 mL/min (A) (by C-G formula based on SCr of 0.54 mg/dL (L)).  No results found for: BNP    Microbiology Results Abnormal     None          Imaging Results (last 24 hours)     Procedure Component Value Units Date/Time    US Gallbladder [[id number]] Collected:  10/10/18 0920     Updated:  10/10/18 0920    Narrative:       EXAMINATION: US GALLBLADDER-     INDICATION: Abdominal pain; R74.8-Abnormal levels of other serum  enzymes; K80.51-Calculus of bile duct without cholangitis or  cholecystitis with obstruction.      TECHNIQUE: Ultrasound right upper quadrant abdomen     COMPARISON: Ultrasound dated 10/5/2018, MRI abdomen 10/7/2018     FINDINGS:      Visualized portions of the pancreas within normal limits.  Liver demonstrates normal echogenicity and echotexture with homogeneous  appearance of the parenchyma. No focal liver lesion.  Gallbladder is present containing multiple echogenic foci with shadowing  consistent cholelithiasis. Gallbladder wall thickening up to 7 mm is  additionally present.     Common bile duct measures 3 mm in diameter at the level the vipul this.     Right kidney measures 11.4 cm in length without evidence of  hydronephrosis, contour deforming mass or obvious calculi.          Impression:       1. Cholelithiasis in a mildly contracted gallbladder now with  gallbladder wall thickening up to 7 mm concerning for acute  cholecystitis.  2. Common bile duct within normal limits.  3. No ascites.                    I have reviewed the medications.      bisacodyl 10 mg Rectal Daily   pantoprazole 40 mg Intravenous Q AM   sodium chloride 3 mL Intravenous Q12H         Assessment/Plan   Assessment / Plan     Active Hospital Problems    Diagnosis   • **Choledocholithiasis   • Elevated liver enzymes   • Hyperbilirubinemia   • Cholelithiasis   • Intractable abdominal pain   •  Jaundice          Brief Hospital Course to date:  Piyush Wren is a 23 y.o. female with no PMH presents with abdominal pain- found to have elevated LFTs and hyperbilirubinemia in the ED.  CT A/P showed ductal dilatation and MRCP revealed obstructing stone in CBD.  She was admitted to our service and GI was consulted.       Assessment & Plan:  --Choledocholithiasis- s/p ERCP with stent placement, GI unable to remove stone.  General Surgery consulted, deferring surgical intervention for time being.  --Elevated LFTs and hyperbilirubinemia-due to above  --repeated Gallbladder US - gallbladder wall thickening - concerning for acute Cholecystitis    > HIDA scan pending    DVT Prophylaxis:  mechanical    CODE STATUS:   Code Status and Medical Interventions:   Ordered at: 10/07/18 0238     Level Of Support Discussed With:    Patient     Code Status:    CPR     Medical Interventions (Level of Support Prior to Arrest):    Full       Disposition: I expect the patient to be discharged TBD      Electronically signed by Oliverio Reinoso MD, 10/10/18, 2:11 PM.

## 2018-10-10 NOTE — PLAN OF CARE
Problem: Patient Care Overview  Goal: Plan of Care Review  Outcome: Ongoing (interventions implemented as appropriate)   10/10/18 3842   Coping/Psychosocial   Plan of Care Reviewed With patient;mother   Plan of Care Review   Progress improving   OTHER   Outcome Summary VSS. Pt denies nausea or pain today. Tolerating CL diet well. Was NPO for HIDA scan which was completed today. IVFs continue. Mom at .      Goal: Individualization and Mutuality  Outcome: Ongoing (interventions implemented as appropriate)    Goal: Interprofessional Rounds/Family Conf  Outcome: Ongoing (interventions implemented as appropriate)      Problem: Pain, Acute (Adult)  Goal: Acceptable Pain Control/Comfort Level  Outcome: Ongoing (interventions implemented as appropriate)

## 2018-10-11 LAB
ALBUMIN SERPL-MCNC: 3.46 G/DL (ref 3.2–4.8)
ALBUMIN/GLOB SERPL: 1.5 G/DL (ref 1.5–2.5)
ALP SERPL-CCNC: 120 U/L (ref 25–100)
ALT SERPL W P-5'-P-CCNC: 193 U/L (ref 7–40)
ANION GAP SERPL CALCULATED.3IONS-SCNC: 12 MMOL/L (ref 3–11)
AST SERPL-CCNC: 43 U/L (ref 0–33)
BASOPHILS # BLD AUTO: 0.04 10*3/MM3 (ref 0–0.2)
BASOPHILS NFR BLD AUTO: 0.3 % (ref 0–1)
BILIRUB SERPL-MCNC: 1.3 MG/DL (ref 0.3–1.2)
BUN BLD-MCNC: <5 MG/DL (ref 9–23)
BUN/CREAT SERPL: ABNORMAL (ref 7–25)
CALCIUM SPEC-SCNC: 8.6 MG/DL (ref 8.7–10.4)
CHLORIDE SERPL-SCNC: 100 MMOL/L (ref 99–109)
CO2 SERPL-SCNC: 22 MMOL/L (ref 20–31)
CREAT BLD-MCNC: 0.5 MG/DL (ref 0.6–1.3)
DEPRECATED RDW RBC AUTO: 42.3 FL (ref 37–54)
EOSINOPHIL # BLD AUTO: 0.37 10*3/MM3 (ref 0–0.3)
EOSINOPHIL NFR BLD AUTO: 2.6 % (ref 0–3)
ERYTHROCYTE [DISTWIDTH] IN BLOOD BY AUTOMATED COUNT: 13.8 % (ref 11.3–14.5)
GFR SERPL CREATININE-BSD FRML MDRD: >150 ML/MIN/1.73
GLOBULIN UR ELPH-MCNC: 2.2 GM/DL
GLUCOSE BLD-MCNC: 66 MG/DL (ref 70–100)
HCT VFR BLD AUTO: 35.1 % (ref 34.5–44)
HGB BLD-MCNC: 11.1 G/DL (ref 11.5–15.5)
IMM GRANULOCYTES # BLD: 0.04 10*3/MM3 (ref 0–0.03)
IMM GRANULOCYTES NFR BLD: 0.3 % (ref 0–0.6)
LYMPHOCYTES # BLD AUTO: 2.29 10*3/MM3 (ref 0.6–4.8)
LYMPHOCYTES NFR BLD AUTO: 15.9 % (ref 24–44)
MCH RBC QN AUTO: 26.8 PG (ref 27–31)
MCHC RBC AUTO-ENTMCNC: 31.6 G/DL (ref 32–36)
MCV RBC AUTO: 84.8 FL (ref 80–99)
MONOCYTES # BLD AUTO: 1.2 10*3/MM3 (ref 0–1)
MONOCYTES NFR BLD AUTO: 8.3 % (ref 0–12)
NEUTROPHILS # BLD AUTO: 10.54 10*3/MM3 (ref 1.5–8.3)
NEUTROPHILS NFR BLD AUTO: 72.9 % (ref 41–71)
PLATELET # BLD AUTO: 300 10*3/MM3 (ref 150–450)
PMV BLD AUTO: 9.8 FL (ref 6–12)
POTASSIUM BLD-SCNC: 4 MMOL/L (ref 3.5–5.5)
PROT SERPL-MCNC: 5.7 G/DL (ref 5.7–8.2)
RBC # BLD AUTO: 4.14 10*6/MM3 (ref 3.89–5.14)
SODIUM BLD-SCNC: 134 MMOL/L (ref 132–146)
WBC NRBC COR # BLD: 14.44 10*3/MM3 (ref 3.5–10.8)

## 2018-10-11 PROCEDURE — 25010000002 MEROPENEM: Performed by: INTERNAL MEDICINE

## 2018-10-11 PROCEDURE — 25010000002 HYDROMORPHONE PER 4 MG: Performed by: INTERNAL MEDICINE

## 2018-10-11 PROCEDURE — 85025 COMPLETE CBC W/AUTO DIFF WBC: CPT | Performed by: INTERNAL MEDICINE

## 2018-10-11 PROCEDURE — 99233 SBSQ HOSP IP/OBS HIGH 50: CPT | Performed by: HOSPITALIST

## 2018-10-11 PROCEDURE — 80053 COMPREHEN METABOLIC PANEL: CPT | Performed by: INTERNAL MEDICINE

## 2018-10-11 PROCEDURE — 25010000002 HYDROMORPHONE 1 MG/ML SOLUTION: Performed by: INTERNAL MEDICINE

## 2018-10-11 RX ORDER — DEXTROSE AND SODIUM CHLORIDE 5; .9 G/100ML; G/100ML
125 INJECTION, SOLUTION INTRAVENOUS CONTINUOUS
Status: DISCONTINUED | OUTPATIENT
Start: 2018-10-11 | End: 2018-10-13

## 2018-10-11 RX ADMIN — HYDROMORPHONE HYDROCHLORIDE 0.25 MG: 1 INJECTION, SOLUTION INTRAMUSCULAR; INTRAVENOUS; SUBCUTANEOUS at 19:40

## 2018-10-11 RX ADMIN — MEROPENEM 1 G: 1 INJECTION, POWDER, FOR SOLUTION INTRAVENOUS at 01:46

## 2018-10-11 RX ADMIN — MEROPENEM 1 G: 1 INJECTION, POWDER, FOR SOLUTION INTRAVENOUS at 10:00

## 2018-10-11 RX ADMIN — HYDROMORPHONE HYDROCHLORIDE 0.25 MG: 1 INJECTION, SOLUTION INTRAMUSCULAR; INTRAVENOUS; SUBCUTANEOUS at 05:53

## 2018-10-11 RX ADMIN — PANTOPRAZOLE SODIUM 40 MG: 40 INJECTION, POWDER, FOR SOLUTION INTRAVENOUS at 05:54

## 2018-10-11 RX ADMIN — DEXTROSE AND SODIUM CHLORIDE 125 ML/HR: 5; 900 INJECTION, SOLUTION INTRAVENOUS at 22:09

## 2018-10-11 RX ADMIN — MEROPENEM 1 G: 1 INJECTION, POWDER, FOR SOLUTION INTRAVENOUS at 19:01

## 2018-10-11 RX ADMIN — HYDROMORPHONE HYDROCHLORIDE 0.25 MG: 1 INJECTION, SOLUTION INTRAMUSCULAR; INTRAVENOUS; SUBCUTANEOUS at 12:48

## 2018-10-11 NOTE — PLAN OF CARE
Problem: Patient Care Overview  Goal: Plan of Care Review  Outcome: Ongoing (interventions implemented as appropriate)   10/11/18 0501   Coping/Psychosocial   Plan of Care Reviewed With patient;mother   Plan of Care Review   Progress improving   OTHER   Outcome Summary PT rested through out the night. Pain controled with prn meds. Denies nausea or any other complaints at this time. Mother at bedside.        Problem: Pain, Acute (Adult)  Goal: Acceptable Pain Control/Comfort Level  Outcome: Ongoing (interventions implemented as appropriate)   10/11/18 0501   Pain, Acute (Adult)   Acceptable Pain Control/Comfort Level making progress toward outcome

## 2018-10-11 NOTE — PLAN OF CARE
Problem: Patient Care Overview  Goal: Plan of Care Review  Outcome: Ongoing (interventions implemented as appropriate)   10/11/18 5619   Coping/Psychosocial   Plan of Care Reviewed With patient;mother   Plan of Care Review   Progress improving

## 2018-10-11 NOTE — PROGRESS NOTES
"General Surgery Daily Progress Note    Subjective:  No new complaints.    Objective:  /64   Pulse 82   Temp 98.7 °F (37.1 °C) (Oral)   Resp 18   Ht 170.2 cm (67\")   Wt 103 kg (226 lb 1.6 oz)   LMP 09/27/2018 Comment: neg hcg 10/6/2018  SpO2 97%   BMI 35.41 kg/m²       General Appearance: NAD  Eyes: Anicteric  Neck: Trachea midline   Cardiovascular:  RRR without murmur nor rub  Lungs:  Bilateral respirations unlabored   Abdomen:  Soft, mild tenderness, no peritonitis  Extremities:  No cyanosis or edema   Skin:  No obvious rashes   Neurologic: awake and conversant       Imaging Results (last 24 hours)     Procedure Component Value Units Date/Time    US Gallbladder [[id number]] Collected:  10/10/18 0920     Updated:  10/11/18 1306    Narrative:       EXAMINATION: US GALLBLADDER-     INDICATION: Abdominal pain; R74.8-Abnormal levels of other serum  enzymes; K80.51-Calculus of bile duct without cholangitis or  cholecystitis with obstruction.      TECHNIQUE: Ultrasound right upper quadrant abdomen     COMPARISON: Ultrasound dated 10/5/2018, MRI abdomen 10/7/2018     FINDINGS:      Visualized portions of the pancreas within normal limits.  Liver demonstrates normal echogenicity and echotexture with homogeneous  appearance of the parenchyma. No focal liver lesion.  Gallbladder is present containing multiple echogenic foci with shadowing  consistent cholelithiasis. Gallbladder wall thickening up to 7 mm is  additionally present.     Common bile duct measures 3 mm in diameter at the level the vipul this.     Right kidney measures 11.4 cm in length without evidence of  hydronephrosis, contour deforming mass or obvious calculi.          Impression:       1. Cholelithiasis in a mildly contracted gallbladder now with  gallbladder wall thickening up to 7 mm concerning for acute  cholecystitis.  2. Common bile duct within normal limits.  3. No ascites.         This report was finalized on 10/11/2018 1:04 PM by Dr." Sher Garcia.       NM Hepatobiliary Without CCK [[id number]] Collected:  10/10/18 1707     Updated:  10/10/18 1712    Narrative:       EXAMINATION: NM HEPATOBILIARY WITHOUT CCK-      INDICATION: RUQ pain, cholecystitis suspected; R74.8-Abnormal levels of  other serum enzymes; K80.51-Calculus of bile duct without cholangitis or  cholecystitis with obstruction     TECHNIQUE:  Anterior projection images were obtained for a total of 60  minutes over the abdomen following the uneventful administration of 7.0  mCi technetium-99m Choletec IV. After failure of the gallbladder to fill  after 1 and her, 4 mg morphine were administered and an additional 35  minutes imaging was performed.     COMPARISONS:  No prior HIDA scan of record. Correlated with right upper  quadrant sonogram 10/9/2018.     FINDINGS:  There is satisfactory extraction of radiotracer in the blood  pool.  The bile ducts are visible after 5 minutes.  The small bowel is  visible after 5 minutes. The gallbladder does not become opacified  spontaneously nor after morphine injection.       Impression:          1. Scintigraphic findings compatible with acute cholecystitis.        This report was finalized on 10/10/2018 5:10 PM by Preet Luis.             CBC:    Results from last 7 days  Lab Units 10/11/18  0511   WBC 10*3/mm3 14.44*   HEMOGLOBIN g/dL 11.1*   HEMATOCRIT % 35.1   PLATELETS 10*3/mm3 300       CMP:    Results from last 7 days  Lab Units 10/11/18  0511   SODIUM mmol/L 134   POTASSIUM mmol/L 4.0   CHLORIDE mmol/L 100   CO2 mmol/L 22.0   BUN mg/dL <5*   CREATININE mg/dL 0.50*   CALCIUM mg/dL 8.6*   BILIRUBIN mg/dL 1.3*   ALK PHOS U/L 120*   ALT (SGPT) U/L 193*   AST (SGOT) U/L 43*   GLUCOSE mg/dL 66*         Assessment:  Choledocholithiasis  Cholelithiasis with subsequent acute cholecystitis  Post ERCP pancreatitis    Plan:   Nothing by mouth after midnight, IV fluids, IV antibiotics and an operation tomorrow.  I had a long discussion with Dr. Cunningham,  the patient, her family, and extended family.  They were unable to retrieve the common bile duct stone with ERCP and placed a stent.  She subsequently developed post-ERCP pancreatitis and repeat imaging demonstrated a thick-walled gallbladder.  Subsequent HIDA scan demonstrates nonvisualization of her gallbladder.  She has an associated elevation in her white count and continued abnormal liver function tests.  Whether this is a residual of her ERCP versus de galdino acute cholecystitis is unclear.  I think that proceeding with cholecystectomy is the most appropriate course of action.  I reviewed her ERCP and MRCP with my partner Dr. Spence.  He and I are going to proceed with laparoscopic cholecystectomy with an associated laparoscopic common bile duct exploration, and hopefully be able to remove her stone through her cystic duct.  Her cystic duct was patent on her ERCP according to Dr. Lane, the gastroenterologist who performed her ERCP.  I had a discussion regarding the risks and benefits of surgery, with her family and extended family.  Our discussion included but was not limited to: bleeding, infection, injury to adjacent viscera (common bile duct, duodenum, colon, associated vessels), retained stones, need for ERCP, bile leak, an open operation in general, T-tube placement, and medical issues from a cardiopulmonary and deep venous thrombosis standpoint.  All their questions were answered and they understand and wish to proceed with surgical intervention.    Derek Escalante MD - 10/11/2018, 4:17 PM

## 2018-10-11 NOTE — PROGRESS NOTES
Continued Stay Note  [facility]     Patient Name: Piyush Wren  MRN: [id number]  Today's Date: 10/11/2018    Admit Date: 10/6/2018          Discharge Plan     Row Name 10/11/18 1408       Plan    Plan Home    Plan Comments Discharge plan remains the same: home with mother. No anticipated needs at this time.               Discharge Codes    No documentation.       Expected Discharge Date and Time     Expected Discharge Date Expected Discharge Time    Oct 11, 2018             Vanna Barnhart RN

## 2018-10-11 NOTE — PROGRESS NOTES
[facility] Medicine Services  PROGRESS NOTE    Patient Name: Piyush Wren  : 1995  MRN: [id number]    Date of Admission: 10/6/2018  Length of Stay: 4  Primary Care Physician: Provider, No Known    Subjective   Subjective     CC:  Abdominal pain    HPI:  HIDA complete and concern for cholecystitis.  Mother in room today.  Planned for OR tomorrow.  Asking about fluids    Review of Systems  Gen- No fevers, chills  CV- No chest pain, palpitations  Resp- No cough, dyspnea  GI- as above    Otherwise ROS is negative except as mentioned in the HPI.    Objective   Objective     Vital Signs:   Temp:  [98.1 °F (36.7 °C)-98.7 °F (37.1 °C)] 98.7 °F (37.1 °C)  Heart Rate:  [82-95] 82  Resp:  [16-20] 18  BP: (120-132)/(64-74) 120/64        Physical Exam:  Constitutional: No acute distress, awake, alert, obese WF  HENT: NCAT, mucous membranes moist  Respiratory: Clear to auscultation bilaterally, respiratory effort normal   Cardiovascular: RRR, no murmurs, rubs, or gallops, palpable pedal pulses bilaterally  Gastrointestinal: mildly tender to light palpation, + BS, ND  Musculoskeletal: No bilateral ankle edema  Psychiatric: Appropriate affect, cooperative  Neurologic: Oriented x 3, strength symmetric in all extremities, Cranial Nerves grossly intact to confrontation, speech clear  Skin: No rashes  Results Reviewed:  I have personally reviewed current lab, radiology, and data and agree.      Results from last 7 days  Lab Units 10/11/18  0511 10/10/18  0355 10/09/18  0657   WBC 10*3/mm3 14.44* 16.18* 16.00*   HEMOGLOBIN g/dL 11.1* 10.8* 11.4*   HEMATOCRIT % 35.1 34.0* 35.3   PLATELETS 10*3/mm3 300 276 261       Results from last 7 days  Lab Units 10/11/18  0511 10/10/18  2004 10/10/18  0355 10/09/18  0657   SODIUM mmol/L 134  --  134 138   POTASSIUM mmol/L 4.0 3.9 3.3* 3.5   CHLORIDE mmol/L 100  --  102 107   CO2 mmol/L 22.0  --  22.0 23.0   BUN mg/dL <5*  --  <5* 6*   CREATININE mg/dL 0.50*  --   0.54* 0.52*   GLUCOSE mg/dL 66*  --  73 81   CALCIUM mg/dL 8.6*  --  8.5* 8.0*   ALT (SGPT) U/L 193*  --  274* 317*   AST (SGOT) U/L 43*  --  112* 193*     Estimated Creatinine Clearance: 217.9 mL/min (A) (by C-G formula based on SCr of 0.5 mg/dL (L)).  No results found for: BNP    Microbiology Results Abnormal     None          Imaging Results (last 24 hours)     Procedure Component Value Units Date/Time    US Gallbladder [[id number]] Collected:  10/10/18 0920     Updated:  10/11/18 1306    Narrative:       EXAMINATION: US GALLBLADDER-     INDICATION: Abdominal pain; R74.8-Abnormal levels of other serum  enzymes; K80.51-Calculus of bile duct without cholangitis or  cholecystitis with obstruction.      TECHNIQUE: Ultrasound right upper quadrant abdomen     COMPARISON: Ultrasound dated 10/5/2018, MRI abdomen 10/7/2018     FINDINGS:      Visualized portions of the pancreas within normal limits.  Liver demonstrates normal echogenicity and echotexture with homogeneous  appearance of the parenchyma. No focal liver lesion.  Gallbladder is present containing multiple echogenic foci with shadowing  consistent cholelithiasis. Gallbladder wall thickening up to 7 mm is  additionally present.     Common bile duct measures 3 mm in diameter at the level the vipul this.     Right kidney measures 11.4 cm in length without evidence of  hydronephrosis, contour deforming mass or obvious calculi.          Impression:       1. Cholelithiasis in a mildly contracted gallbladder now with  gallbladder wall thickening up to 7 mm concerning for acute  cholecystitis.  2. Common bile duct within normal limits.  3. No ascites.         This report was finalized on 10/11/2018 1:04 PM by Dr. Sher Garcia.       NM Hepatobiliary Without CCK [[id number]] Collected:  10/10/18 1707     Updated:  10/10/18 1712    Narrative:       EXAMINATION: NM HEPATOBILIARY WITHOUT CCK-      INDICATION: RUQ pain, cholecystitis suspected; R74.8-Abnormal levels of  other  serum enzymes; K80.51-Calculus of bile duct without cholangitis or  cholecystitis with obstruction     TECHNIQUE:  Anterior projection images were obtained for a total of 60  minutes over the abdomen following the uneventful administration of 7.0  mCi technetium-99m Choletec IV. After failure of the gallbladder to fill  after 1 and her, 4 mg morphine were administered and an additional 35  minutes imaging was performed.     COMPARISONS:  No prior HIDA scan of record. Correlated with right upper  quadrant sonogram 10/9/2018.     FINDINGS:  There is satisfactory extraction of radiotracer in the blood  pool.  The bile ducts are visible after 5 minutes.  The small bowel is  visible after 5 minutes. The gallbladder does not become opacified  spontaneously nor after morphine injection.       Impression:          1. Scintigraphic findings compatible with acute cholecystitis.        This report was finalized on 10/10/2018 5:10 PM by Preet Luis.                I have reviewed the medications.      bisacodyl 10 mg Rectal Daily   meropenem 1 g Intravenous Q8H   pantoprazole 40 mg Intravenous Q AM   sodium chloride 3 mL Intravenous Q12H         Assessment/Plan   Assessment / Plan     Active Hospital Problems    Diagnosis   • **Choledocholithiasis   • Elevated liver enzymes   • Hyperbilirubinemia   • Cholelithiasis   • Intractable abdominal pain   • Jaundice          Brief Hospital Course to date:  Piyush Wren is a 23 y.o. female with no PMH presents with abdominal pain- found to have elevated LFTs and hyperbilirubinemia in the ED.  CT A/P showed ductal dilatation and MRCP revealed obstructing stone in CBD.  She was admitted to our service and GI was consulted.       Assessment & Plan:  --Choledocholithiasis- s/p ERCP with stent placement, GI unable to remove stone.  General Surgery consulted, deferring surgical intervention for time being.  --Elevated LFTs and hyperbilirubinemia-due to above  --repeated Gallbladder US -  gallbladder wall thickening - concerning for acute Cholecystitis    > HIDA consistent with cholecystitis    Planned for OR tomorrow per Dr. Escalante  Discussed case with Dr. Cunningham and Dr. Escalante today    DVT Prophylaxis:  mechanical    CODE STATUS:   Code Status and Medical Interventions:   Ordered at: 10/07/18 0238     Level Of Support Discussed With:    Patient     Code Status:    CPR     Medical Interventions (Level of Support Prior to Arrest):    Full       Disposition: I expect the patient to be discharged TBD      Electronically signed by Oliverio Reinoso MD, 10/11/18, 2:23 PM.

## 2018-10-12 ENCOUNTER — APPOINTMENT (OUTPATIENT)
Dept: GENERAL RADIOLOGY | Facility: HOSPITAL | Age: 23
End: 2018-10-12

## 2018-10-12 ENCOUNTER — ANESTHESIA EVENT (OUTPATIENT)
Dept: PERIOP | Facility: HOSPITAL | Age: 23
End: 2018-10-12

## 2018-10-12 ENCOUNTER — ANESTHESIA (OUTPATIENT)
Dept: PERIOP | Facility: HOSPITAL | Age: 23
End: 2018-10-12

## 2018-10-12 LAB
B-HCG UR QL: NEGATIVE
GLUCOSE BLDC GLUCOMTR-MCNC: 88 MG/DL (ref 70–130)
INTERNAL NEGATIVE CONTROL: NEGATIVE
INTERNAL POSITIVE CONTROL: NORMAL
Lab: NORMAL

## 2018-10-12 PROCEDURE — 25010000002 FENTANYL CITRATE (PF) 100 MCG/2ML SOLUTION: Performed by: NURSE ANESTHETIST, CERTIFIED REGISTERED

## 2018-10-12 PROCEDURE — 25010000002 NEOSTIGMINE PER 0.5 MG: Performed by: NURSE ANESTHETIST, CERTIFIED REGISTERED

## 2018-10-12 PROCEDURE — 82962 GLUCOSE BLOOD TEST: CPT

## 2018-10-12 PROCEDURE — 25010000002 MEROPENEM: Performed by: INTERNAL MEDICINE

## 2018-10-12 PROCEDURE — 25010000002 IOPAMIDOL 61 % SOLUTION: Performed by: SURGERY

## 2018-10-12 PROCEDURE — 25010000002 ONDANSETRON PER 1 MG: Performed by: NURSE ANESTHETIST, CERTIFIED REGISTERED

## 2018-10-12 PROCEDURE — 0FT44ZZ RESECTION OF GALLBLADDER, PERCUTANEOUS ENDOSCOPIC APPROACH: ICD-10-PCS | Performed by: SURGERY

## 2018-10-12 PROCEDURE — 25010000002 DEXAMETHASONE PER 1 MG: Performed by: NURSE ANESTHETIST, CERTIFIED REGISTERED

## 2018-10-12 PROCEDURE — 88304 TISSUE EXAM BY PATHOLOGIST: CPT | Performed by: SURGERY

## 2018-10-12 PROCEDURE — 25010000002 HYDROMORPHONE PER 4 MG: Performed by: NURSE ANESTHETIST, CERTIFIED REGISTERED

## 2018-10-12 PROCEDURE — 81025 URINE PREGNANCY TEST: CPT | Performed by: ANESTHESIOLOGY

## 2018-10-12 PROCEDURE — 25010000002 HYDROMORPHONE PER 4 MG: Performed by: INTERNAL MEDICINE

## 2018-10-12 PROCEDURE — 25010000002 PROPOFOL 10 MG/ML EMULSION: Performed by: NURSE ANESTHETIST, CERTIFIED REGISTERED

## 2018-10-12 PROCEDURE — 3C1ZX8Z IRRIGATION OF INDWELLING DEVICE USING IRRIGATING SUBSTANCE, EXTERNAL APPROACH: ICD-10-PCS | Performed by: SURGERY

## 2018-10-12 PROCEDURE — 99233 SBSQ HOSP IP/OBS HIGH 50: CPT | Performed by: HOSPITALIST

## 2018-10-12 RX ORDER — BUPIVACAINE HYDROCHLORIDE AND EPINEPHRINE 5; 5 MG/ML; UG/ML
INJECTION, SOLUTION PERINEURAL AS NEEDED
Status: DISCONTINUED | OUTPATIENT
Start: 2018-10-12 | End: 2018-10-12 | Stop reason: HOSPADM

## 2018-10-12 RX ORDER — SODIUM CHLORIDE 9 MG/ML
INJECTION, SOLUTION INTRAVENOUS AS NEEDED
Status: DISCONTINUED | OUTPATIENT
Start: 2018-10-12 | End: 2018-10-12 | Stop reason: HOSPADM

## 2018-10-12 RX ORDER — SODIUM CHLORIDE 0.9 % (FLUSH) 0.9 %
3 SYRINGE (ML) INJECTION EVERY 12 HOURS SCHEDULED
Status: DISCONTINUED | OUTPATIENT
Start: 2018-10-12 | End: 2018-10-12

## 2018-10-12 RX ORDER — LIDOCAINE HYDROCHLORIDE 10 MG/ML
INJECTION, SOLUTION INFILTRATION; PERINEURAL AS NEEDED
Status: DISCONTINUED | OUTPATIENT
Start: 2018-10-12 | End: 2018-10-12 | Stop reason: SURG

## 2018-10-12 RX ORDER — HYDROCODONE BITARTRATE AND ACETAMINOPHEN 5; 325 MG/1; MG/1
1 TABLET ORAL ONCE AS NEEDED
Status: DISCONTINUED | OUTPATIENT
Start: 2018-10-12 | End: 2018-10-12

## 2018-10-12 RX ORDER — HYDROCODONE BITARTRATE AND ACETAMINOPHEN 7.5; 325 MG/1; MG/1
1 TABLET ORAL EVERY 4 HOURS PRN
Status: DISCONTINUED | OUTPATIENT
Start: 2018-10-12 | End: 2018-10-14 | Stop reason: HOSPADM

## 2018-10-12 RX ORDER — DEXAMETHASONE SODIUM PHOSPHATE 4 MG/ML
INJECTION, SOLUTION INTRA-ARTICULAR; INTRALESIONAL; INTRAMUSCULAR; INTRAVENOUS; SOFT TISSUE AS NEEDED
Status: DISCONTINUED | OUTPATIENT
Start: 2018-10-12 | End: 2018-10-12 | Stop reason: SURG

## 2018-10-12 RX ORDER — FENTANYL CITRATE 50 UG/ML
50 INJECTION, SOLUTION INTRAMUSCULAR; INTRAVENOUS
Status: DISCONTINUED | OUTPATIENT
Start: 2018-10-12 | End: 2018-10-12

## 2018-10-12 RX ORDER — SCOLOPAMINE TRANSDERMAL SYSTEM 1 MG/1
1 PATCH, EXTENDED RELEASE TRANSDERMAL ONCE
Status: DISCONTINUED | OUTPATIENT
Start: 2018-10-12 | End: 2018-10-12

## 2018-10-12 RX ORDER — PROMETHAZINE HYDROCHLORIDE 25 MG/ML
6.25 INJECTION, SOLUTION INTRAMUSCULAR; INTRAVENOUS ONCE AS NEEDED
Status: DISCONTINUED | OUTPATIENT
Start: 2018-10-12 | End: 2018-10-12

## 2018-10-12 RX ORDER — PROPOFOL 10 MG/ML
VIAL (ML) INTRAVENOUS AS NEEDED
Status: DISCONTINUED | OUTPATIENT
Start: 2018-10-12 | End: 2018-10-12 | Stop reason: SURG

## 2018-10-12 RX ORDER — ONDANSETRON 2 MG/ML
INJECTION INTRAMUSCULAR; INTRAVENOUS AS NEEDED
Status: DISCONTINUED | OUTPATIENT
Start: 2018-10-12 | End: 2018-10-12 | Stop reason: SURG

## 2018-10-12 RX ORDER — IPRATROPIUM BROMIDE AND ALBUTEROL SULFATE 2.5; .5 MG/3ML; MG/3ML
3 SOLUTION RESPIRATORY (INHALATION) ONCE AS NEEDED
Status: DISCONTINUED | OUTPATIENT
Start: 2018-10-12 | End: 2018-10-12

## 2018-10-12 RX ORDER — ONDANSETRON 2 MG/ML
4 INJECTION INTRAMUSCULAR; INTRAVENOUS ONCE AS NEEDED
Status: DISCONTINUED | OUTPATIENT
Start: 2018-10-12 | End: 2018-10-12

## 2018-10-12 RX ORDER — ATRACURIUM BESYLATE 10 MG/ML
INJECTION, SOLUTION INTRAVENOUS AS NEEDED
Status: DISCONTINUED | OUTPATIENT
Start: 2018-10-12 | End: 2018-10-12 | Stop reason: SURG

## 2018-10-12 RX ORDER — FENTANYL CITRATE 50 UG/ML
INJECTION, SOLUTION INTRAMUSCULAR; INTRAVENOUS AS NEEDED
Status: DISCONTINUED | OUTPATIENT
Start: 2018-10-12 | End: 2018-10-12 | Stop reason: SURG

## 2018-10-12 RX ORDER — KETOROLAC TROMETHAMINE 30 MG/ML
30 INJECTION, SOLUTION INTRAMUSCULAR; INTRAVENOUS EVERY 6 HOURS PRN
Status: DISPENSED | OUTPATIENT
Start: 2018-10-12 | End: 2018-10-13

## 2018-10-12 RX ORDER — NALOXONE HCL 0.4 MG/ML
0.4 VIAL (ML) INJECTION AS NEEDED
Status: DISCONTINUED | OUTPATIENT
Start: 2018-10-12 | End: 2018-10-12

## 2018-10-12 RX ORDER — LABETALOL HYDROCHLORIDE 5 MG/ML
5 INJECTION, SOLUTION INTRAVENOUS
Status: DISCONTINUED | OUTPATIENT
Start: 2018-10-12 | End: 2018-10-12

## 2018-10-12 RX ORDER — PROMETHAZINE HYDROCHLORIDE 25 MG/1
25 SUPPOSITORY RECTAL ONCE AS NEEDED
Status: DISCONTINUED | OUTPATIENT
Start: 2018-10-12 | End: 2018-10-12

## 2018-10-12 RX ORDER — GLYCOPYRROLATE 0.2 MG/ML
INJECTION INTRAMUSCULAR; INTRAVENOUS AS NEEDED
Status: DISCONTINUED | OUTPATIENT
Start: 2018-10-12 | End: 2018-10-12 | Stop reason: SURG

## 2018-10-12 RX ORDER — HYDROMORPHONE HYDROCHLORIDE 1 MG/ML
0.5 INJECTION, SOLUTION INTRAMUSCULAR; INTRAVENOUS; SUBCUTANEOUS
Status: DISCONTINUED | OUTPATIENT
Start: 2018-10-12 | End: 2018-10-12

## 2018-10-12 RX ORDER — MEPERIDINE HYDROCHLORIDE 25 MG/ML
12.5 INJECTION INTRAMUSCULAR; INTRAVENOUS; SUBCUTANEOUS
Status: DISCONTINUED | OUTPATIENT
Start: 2018-10-12 | End: 2018-10-12

## 2018-10-12 RX ORDER — PROMETHAZINE HYDROCHLORIDE 25 MG/1
25 TABLET ORAL ONCE AS NEEDED
Status: DISCONTINUED | OUTPATIENT
Start: 2018-10-12 | End: 2018-10-12

## 2018-10-12 RX ORDER — SENNA AND DOCUSATE SODIUM 50; 8.6 MG/1; MG/1
2 TABLET, FILM COATED ORAL NIGHTLY
Status: DISCONTINUED | OUTPATIENT
Start: 2018-10-12 | End: 2018-10-14 | Stop reason: HOSPADM

## 2018-10-12 RX ORDER — HYDROMORPHONE HYDROCHLORIDE 1 MG/ML
0.5 INJECTION, SOLUTION INTRAMUSCULAR; INTRAVENOUS; SUBCUTANEOUS
Status: DISCONTINUED | OUTPATIENT
Start: 2018-10-12 | End: 2018-10-12 | Stop reason: HOSPADM

## 2018-10-12 RX ORDER — SODIUM CHLORIDE, SODIUM LACTATE, POTASSIUM CHLORIDE, AND CALCIUM CHLORIDE .6; .31; .03; .02 G/100ML; G/100ML; G/100ML; G/100ML
9 INJECTION, SOLUTION INTRAVENOUS ONCE
Status: COMPLETED | OUTPATIENT
Start: 2018-10-12 | End: 2018-10-12

## 2018-10-12 RX ORDER — SODIUM CHLORIDE 0.9 % (FLUSH) 0.9 %
1-10 SYRINGE (ML) INJECTION AS NEEDED
Status: DISCONTINUED | OUTPATIENT
Start: 2018-10-12 | End: 2018-10-12

## 2018-10-12 RX ORDER — SODIUM CHLORIDE, SODIUM LACTATE, POTASSIUM CHLORIDE, CALCIUM CHLORIDE 600; 310; 30; 20 MG/100ML; MG/100ML; MG/100ML; MG/100ML
INJECTION, SOLUTION INTRAVENOUS CONTINUOUS PRN
Status: DISCONTINUED | OUTPATIENT
Start: 2018-10-12 | End: 2018-10-12 | Stop reason: SURG

## 2018-10-12 RX ADMIN — ATRACURIUM BESYLATE 20 MG: 10 INJECTION, SOLUTION INTRAVENOUS at 16:20

## 2018-10-12 RX ADMIN — HYDROCODONE BITARTRATE AND ACETAMINOPHEN 1 TABLET: 7.5; 325 TABLET ORAL at 22:35

## 2018-10-12 RX ADMIN — SCOPALAMINE 1 PATCH: 1 PATCH, EXTENDED RELEASE TRANSDERMAL at 14:44

## 2018-10-12 RX ADMIN — FENTANYL CITRATE 50 MCG: 50 INJECTION, SOLUTION INTRAMUSCULAR; INTRAVENOUS at 18:15

## 2018-10-12 RX ADMIN — Medication 3 ML: at 21:16

## 2018-10-12 RX ADMIN — SODIUM CHLORIDE, POTASSIUM CHLORIDE, SODIUM LACTATE AND CALCIUM CHLORIDE: 600; 310; 30; 20 INJECTION, SOLUTION INTRAVENOUS at 15:47

## 2018-10-12 RX ADMIN — MEROPENEM 1 G: 1 INJECTION, POWDER, FOR SOLUTION INTRAVENOUS at 10:09

## 2018-10-12 RX ADMIN — HYDROMORPHONE HYDROCHLORIDE 0.5 MG: 1 INJECTION, SOLUTION INTRAMUSCULAR; INTRAVENOUS; SUBCUTANEOUS at 18:35

## 2018-10-12 RX ADMIN — ATRACURIUM BESYLATE 30 MG: 10 INJECTION, SOLUTION INTRAVENOUS at 15:54

## 2018-10-12 RX ADMIN — ONDANSETRON 4 MG: 2 INJECTION INTRAMUSCULAR; INTRAVENOUS at 17:37

## 2018-10-12 RX ADMIN — DEXAMETHASONE SODIUM PHOSPHATE 8 MG: 4 INJECTION, SOLUTION INTRAMUSCULAR; INTRAVENOUS at 15:58

## 2018-10-12 RX ADMIN — FENTANYL CITRATE 100 MCG: 50 INJECTION, SOLUTION INTRAMUSCULAR; INTRAVENOUS at 15:54

## 2018-10-12 RX ADMIN — FENTANYL CITRATE 50 MCG: 50 INJECTION, SOLUTION INTRAMUSCULAR; INTRAVENOUS at 18:20

## 2018-10-12 RX ADMIN — HYDROMORPHONE HYDROCHLORIDE 0.5 MG: 1 INJECTION, SOLUTION INTRAMUSCULAR; INTRAVENOUS; SUBCUTANEOUS at 18:00

## 2018-10-12 RX ADMIN — MEROPENEM 1 G: 1 INJECTION, POWDER, FOR SOLUTION INTRAVENOUS at 01:03

## 2018-10-12 RX ADMIN — FENTANYL CITRATE 100 MCG: 50 INJECTION, SOLUTION INTRAMUSCULAR; INTRAVENOUS at 16:20

## 2018-10-12 RX ADMIN — LIDOCAINE HYDROCHLORIDE 50 MG: 10 INJECTION, SOLUTION INFILTRATION; PERINEURAL at 15:54

## 2018-10-12 RX ADMIN — DOCUSATE SODIUM AND SENNOSIDES 2 TABLET: 50; 8.6 TABLET ORAL at 21:16

## 2018-10-12 RX ADMIN — METOPROLOL TARTRATE 1 MG: 5 INJECTION, SOLUTION INTRAVENOUS at 16:39

## 2018-10-12 RX ADMIN — Medication 3 MG: at 17:48

## 2018-10-12 RX ADMIN — HYDROMORPHONE HYDROCHLORIDE 0.5 MG: 1 INJECTION, SOLUTION INTRAMUSCULAR; INTRAVENOUS; SUBCUTANEOUS at 18:10

## 2018-10-12 RX ADMIN — ATRACURIUM BESYLATE 20 MG: 10 INJECTION, SOLUTION INTRAVENOUS at 17:03

## 2018-10-12 RX ADMIN — DEXTROSE AND SODIUM CHLORIDE 125 ML/HR: 5; 900 INJECTION, SOLUTION INTRAVENOUS at 08:36

## 2018-10-12 RX ADMIN — GLYCOPYRROLATE 0.4 MG: 0.2 INJECTION, SOLUTION INTRAMUSCULAR; INTRAVENOUS at 17:48

## 2018-10-12 RX ADMIN — SODIUM CHLORIDE, POTASSIUM CHLORIDE, SODIUM LACTATE AND CALCIUM CHLORIDE 9 ML/HR: 600; 310; 30; 20 INJECTION, SOLUTION INTRAVENOUS at 13:45

## 2018-10-12 RX ADMIN — PROPOFOL 250 MG: 10 INJECTION, EMULSION INTRAVENOUS at 15:54

## 2018-10-12 RX ADMIN — MEROPENEM 1 G: 1 INJECTION, POWDER, FOR SOLUTION INTRAVENOUS at 17:39

## 2018-10-12 RX ADMIN — HYDROMORPHONE HYDROCHLORIDE 0.25 MG: 1 INJECTION, SOLUTION INTRAMUSCULAR; INTRAVENOUS; SUBCUTANEOUS at 01:06

## 2018-10-12 RX ADMIN — DEXTROSE AND SODIUM CHLORIDE 125 ML/HR: 5; 900 INJECTION, SOLUTION INTRAVENOUS at 23:46

## 2018-10-12 RX ADMIN — PANTOPRAZOLE SODIUM 40 MG: 40 INJECTION, POWDER, FOR SOLUTION INTRAVENOUS at 06:43

## 2018-10-12 NOTE — PROGRESS NOTES
"                  Clinical Nutrition     Nutrition Assessment  Reason for Visit:   LOS      Patient Name: Piyush Wren  YOB: 1995  MRN: [id number]  Date of Encounter: 10/12/18 10:18 AM  Admission date: 10/6/2018        Nutrition Assessment   Assessment       Admission diagnosis     Choledocholithiasis    Elevated liver enzymes    Hyperbilirubinemia    Cholelithiasis    Intractable abdominal pain    Jaundice    Applicable diagnosis   s/p ERCP showing large obstructing stone unable to be removed.  Temporary stent placed  Post ERCP pancreatitis  Elevated LFTs and hyperbilirubinemia  HIDA consistent with cholecystitis  (10/12) Plans for laparoscopic cholecystectomy with an associated laparoscopic common bile duct exploration         PMH: She  has no past medical history on file.   PSxH: She  has a past surgical history that includes Pilonidal cyst drainage (2013) and ERCP (N/A, 10/7/2018).       Diet order since admission:  10/12 @0001: NPO  10/9 @ 1710: Clear liquid  10/7 @1417: Regular, low fat  10/7 @0400: NPO  10/6 @ 2108: NPO    Reported/Observed/Food/Nutrition Related History:      Pt asleep, RD spoke to pts mother via phone who stated pt was eating well and her wt was stable PTA. She states pt had been tolerating PO intake and had been drinking Boost Plus 3 times daily (consuming 100% of).       Anthropometrics     Height: 170.2 cm (67\")  Last filed wt: Weight: 103 kg (226 lb 1.6 oz) (10/07/18 0347)  Weight Method: Bed scale    BMI: BMI (Calculated): 35.4  Obese Class II: 35-39.9kg/m2    Ideal Body Weight (IBW) (kg): 61.86      Labs reviewed       Results from last 7 days  Lab Units 10/11/18  0511 10/10/18  2004 10/10/18  0355 10/09/18  0657   GLUCOSE mg/dL 66*  --  73 81   BUN mg/dL <5*  --  <5* 6*   CREATININE mg/dL 0.50*  --  0.54* 0.52*   SODIUM mmol/L 134  --  134 138   CHLORIDE mmol/L 100  --  102 107   POTASSIUM mmol/L 4.0 3.9 3.3* 3.5   ALT (SGPT) U/L 193*  --  274* 317* "         Medications reviewed   Pertinent:  GTT: D5+NaCl@125mL/hr    Other: ABX, dulcolax    Current Nutrition Prescription     PO: NPO Diet    Active Supplement Orders      Dietary Nutrition Supplements Boost Plus TID      Dietary Nutrition Supplements Boost Plus @bedtime     Intake: 54% x 6 meals  Last 3 recorded meals = 100%          Nutrition Diagnosis     10/12  Problem Inadequate energy intake, inadequate protein intake   Etiology Clinical status/GI status    Signs/Symptoms Poor PO intake (54% x 6 meals) now NPO for surgery     Nutrition Intervention   1.  Follow treatment progress, Care plan reviewed  2. No additional interventions at this time r/t NPO status      Goal:   General: Nutrition support treatment  PO: Establish PO s/p surgery    Monitoring/Evaluation:   Per protocol, I&O, PO intake, Supplement intake, GI status      Will Continue to follow per protocol      Sona Javed RDN, LD  Time Spent: 35min

## 2018-10-12 NOTE — ANESTHESIA PREPROCEDURE EVALUATION
Anesthesia Evaluation     Patient summary reviewed and Nursing notes reviewed   NPO Solid Status: > 8 hours  NPO Liquid Status: > 8 hours           Airway   Mallampati: I  TM distance: >3 FB  Neck ROM: full  No difficulty expected  Dental      Pulmonary    (-) COPD, asthma, shortness of breath, recent URI, not a smoker  Cardiovascular     (-) hypertension, valvular problems/murmurs, past MI, CAD, dysrhythmias, cardiac stents, hyperlipidemia      Neuro/Psych  (-) seizures, TIA, CVA  GI/Hepatic/Renal/Endo    (+) obesity,     (-) liver disease, no renal disease, diabetes, hypothyroidism    ROS Comment: SP  ERCP  10/7/18 for GS and Jaundice     Musculoskeletal     Abdominal    Substance History      OB/GYN          Other        ROS/Med Hx Other: BSL 88       Phys Exam Other: Grade 1 view on DL 10/7/18              Anesthesia Plan    ASA 2     general   (Propofol Infusion as part of Anti PONV tech )  Anesthetic plan, all risks, benefits, and alternatives have been provided, discussed and informed consent has been obtained with: patient.    Plan discussed with CRNA.

## 2018-10-12 NOTE — PROGRESS NOTES
Continued Stay Note  [facility]     Patient Name: Piyush Wren  MRN: [id number]  Today's Date: 10/12/2018    Admit Date: 10/6/2018          Discharge Plan     Row Name 10/12/18 1259       Plan    Plan Home    Patient/Family in Agreement with Plan yes    Plan Comments Spoke with Ms. Wren. Her discharge plan is to return home with her mother. No needs identified at this time.     Final Discharge Disposition Code 01 - home or self-care              Discharge Codes    No documentation.       Expected Discharge Date and Time     Expected Discharge Date Expected Discharge Time    Oct 13, 2018             Vanna Barnhart RN

## 2018-10-12 NOTE — PLAN OF CARE
Problem: Patient Care Overview  Goal: Plan of Care Review  Outcome: Ongoing (interventions implemented as appropriate)   10/12/18 0334 10/12/18 1853   Coping/Psychosocial   Plan of Care Reviewed With --  patient   Plan of Care Review   Progress improving --    OTHER   Outcome Summary VSS no overnight events. rested well. NPO for lap maddy/bile duct exploration by Dr. Escalante/Dr. Calderón today. some pain improved with prn meds. mother at bedside.  --      Goal: Discharge Needs Assessment  Outcome: Ongoing (interventions implemented as appropriate)   10/08/18 1318 10/12/18 0334   Discharge Needs Assessment   Readmission Within the Last 30 Days no previous admission in last 30 days --    Concerns to be Addressed --  denies needs/concerns at this time   Patient/Family Anticipates Transition to home with family --    Patient/Family Anticipated Services at Transition none --    Transportation Concerns car, none --    Transportation Anticipated family or friend will provide --    Anticipated Changes Related to Illness none --    Equipment Needed After Discharge none --    Disability   Equipment Currently Used at Home none --        Problem: Pain, Acute (Adult)  Intervention: Monitor and Manage Analgesia   10/12/18 1853   Safety Management   Medication Review/Management medications reviewed     Intervention: Mutually Develop/Implement Acute Pain Management Plan   10/12/18 1820   Promote Oxygenation/Perfusion   Pain Management Interventions see MAR     Intervention: Support/Optimize Psychosocial Response to Acute Pain   10/12/18 1853   Interventions   Trust Relationship/Rapport reassurance provided;care explained;choices provided;emotional support provided;empathic listening provided;questions answered;questions encouraged;thoughts/feelings acknowledged   Psychosocial Support   Diversional Activities smartphone;television   Family/Support System Care involvement promoted       Goal: Acceptable Pain Control/Comfort  Level  Outcome: Ongoing (interventions implemented as appropriate)   10/12/18 0334   Pain, Acute (Adult)   Acceptable Pain Control/Comfort Level making progress toward outcome

## 2018-10-12 NOTE — ANESTHESIA POSTPROCEDURE EVALUATION
Patient: Piyush Wren    Procedure Summary     Date:  10/12/18 Room / Location:   [facility] /  [facility]    Anesthesia Start:  1547 Anesthesia Stop:  1802    Procedures:       CHOLECYSTECTOMY LAPAROSCOPIC INTRAOPERATIVE CHOLANGIOGRAM (N/A Abdomen)      COMMON BILE DUCT EXPLORATION (N/A Abdomen) Diagnosis:      Surgeon:  Surendra Calderón MD Provider:  Quirino Alatorre MD    Anesthesia Type:  general ASA Status:  2          Anesthesia Type: general  Last vitals  BP   119/69 (10/12/18 1342)   Temp   97.9 °F (36.6 °C) (10/12/18 1342)   Pulse   104 (10/12/18 1342)   Resp   16 (10/12/18 1342)     SpO2   98 % (10/12/18 1342)     Post Anesthesia Care and Evaluation    Patient location during evaluation: PACU  Patient participation: complete - patient participated  Level of consciousness: awake and alert  Pain score: 0  Pain management: adequate  Airway patency: patent  Anesthetic complications: No anesthetic complications  PONV Status: none  Cardiovascular status: hemodynamically stable and acceptable  Respiratory status: nonlabored ventilation, acceptable and nasal cannula  Hydration status: acceptable

## 2018-10-12 NOTE — PROGRESS NOTES
[facility] Medicine Services  PROGRESS NOTE    Patient Name: Piyush Wren  : 1995  MRN: [id number]    Date of Admission: 10/6/2018  Length of Stay: 5  Primary Care Physician: Provider, No Known    Subjective   Subjective     CC:  Abdominal pain    HPI:  Seen in Pre Op bay 10.  Planned for OR this afternoon.  Mother in pre-op bay waiting.  No overnight events reported.    Review of Systems  Gen- No fevers, chills  CV- No chest pain, palpitations  Resp- No cough, dyspnea  GI- as above    Otherwise ROS is negative except as mentioned in the HPI.    Objective   Objective     Vital Signs:   Temp:  [97.9 °F (36.6 °C)-99.4 °F (37.4 °C)] 97.9 °F (36.6 °C)  Heart Rate:  [] 104  Resp:  [16-18] 16  BP: (109-131)/(57-72) 119/69        Physical Exam:  Constitutional: No acute distress, awake, alert, obese WF  HENT: NCAT, mucous membranes moist  Respiratory: Clear to auscultation bilaterally, respiratory effort normal   Cardiovascular: RRR, no murmurs, rubs, or gallops, palpable pedal pulses bilaterally  Gastrointestinal: mildly tender to light palpation, + BS, ND  Musculoskeletal: No bilateral ankle edema  Psychiatric: Appropriate affect, cooperative  Neurologic: Oriented x 3, strength symmetric in all extremities, Cranial Nerves grossly intact to confrontation, speech clear  Skin: No rashes  Results Reviewed:  I have personally reviewed current lab, radiology, and data and agree.      Results from last 7 days  Lab Units 10/11/18  0511 10/10/18  0355 10/09/18  0657   WBC 10*3/mm3 14.44* 16.18* 16.00*   HEMOGLOBIN g/dL 11.1* 10.8* 11.4*   HEMATOCRIT % 35.1 34.0* 35.3   PLATELETS 10*3/mm3 300 276 261       Results from last 7 days  Lab Units 10/11/18  0511 10/10/18  2004 10/10/18  0355 10/09/18  0657   SODIUM mmol/L 134  --  134 138   POTASSIUM mmol/L 4.0 3.9 3.3* 3.5   CHLORIDE mmol/L 100  --  102 107   CO2 mmol/L 22.0  --  22.0 23.0   BUN mg/dL <5*  --  <5* 6*   CREATININE mg/dL 0.50*   --  0.54* 0.52*   GLUCOSE mg/dL 66*  --  73 81   CALCIUM mg/dL 8.6*  --  8.5* 8.0*   ALT (SGPT) U/L 193*  --  274* 317*   AST (SGOT) U/L 43*  --  112* 193*     Estimated Creatinine Clearance: 217.9 mL/min (A) (by C-G formula based on SCr of 0.5 mg/dL (L)).  No results found for: BNP    Microbiology Results Abnormal     None          Imaging Results (last 24 hours)     ** No results found for the last 24 hours. **             I have reviewed the medications.      [MAR Hold] bisacodyl 10 mg Rectal Daily   [MAR Hold] meropenem 1 g Intravenous Q8H   [MAR Hold] pantoprazole 40 mg Intravenous Q AM   sodium chloride 3 mL Intravenous Q12H         Assessment/Plan   Assessment / Plan     Active Hospital Problems    Diagnosis   • **Choledocholithiasis   • Elevated liver enzymes   • Hyperbilirubinemia   • Cholelithiasis   • Intractable abdominal pain   • Jaundice          Brief Hospital Course to date:  Piyush Wren is a 23 y.o. female with no PMH presents with abdominal pain- found to have elevated LFTs and hyperbilirubinemia in the ED.  CT A/P showed ductal dilatation and MRCP revealed obstructing stone in CBD.  She was admitted to our service and GI was consulted.       Assessment & Plan:  --Choledocholithiasis- s/p ERCP with stent placement, GI unable to remove stone.  General Surgery consulted, deferring surgical intervention for time being.  --Elevated LFTs and hyperbilirubinemia-due to above  --repeated Gallbladder US - gallbladder wall thickening - concerning for acute Cholecystitis    > HIDA consistent with cholecystitis    Planned for OR today - this afternoon  Has been NPO    DVT Prophylaxis:  mechanical    CODE STATUS:   Code Status and Medical Interventions:   Ordered at: 10/07/18 0238     Level Of Support Discussed With:    Patient     Code Status:    CPR     Medical Interventions (Level of Support Prior to Arrest):    Full       Disposition: I expect the patient to be discharged TBD      Electronically  signed by Oliverio Reinoso MD, 10/12/18, 2:30 PM.

## 2018-10-12 NOTE — BRIEF OP NOTE
CHOLECYSTECTOMY LAPAROSCOPIC INTRAOPERATIVE CHOLANGIOGRAM, COMMON BILE DUCT EXPLORATION  Progress Note    Piyush Wren  10/6/2018 - 10/12/2018    Pre-op Diagnosis:   Acute cholecystitis, choledocholithiasis       Post-Op Diagnosis Codes:  Same    Procedure/CPT® Codes:      Procedure(s):  CHOLECYSTECTOMY LAPAROSCOPIC COMMON BILE DUCT EXPLORATION    Surgeon(s):  Derek Escalante MD McKenzie, Shaun Patrick, MD    Anesthesia: General    Staff:   Circulator: Jamison Garcia RN; Kate Chopra RN  Scrub Person: Chayo Flores April J  Nursing Assistant: Jhoana Desai    Estimated Blood Loss: 50 mL    Urine Voided: * No values recorded between 10/12/2018  3:47 PM and 10/12/2018  6:00 PM *    Specimens:                ID Type Source Tests Collected by Time   A : gallbladder with stones Tissue Gallbladder TISSUE PATHOLOGY EXAM Surendra Calderón MD 10/12/2018 1739         Drains:   Closed/Suction Drain 1 Abdomen Bulb 15 Fr. (Active)   Dressing Status Old drainage 10/12/2018  6:15 PM   Drainage Appearance Serosanguineous 10/12/2018  6:15 PM   Status To bulb suction 10/12/2018  6:15 PM       Findings: Acute cholecystitis identified.  Fundus first approach to cholecystectomy performed.  Unable to access bile duct through cystic duct and therefore, bile duct stone extracted through separate choledochotomy primarily repaired    Complications: None immediate      Surendra Calderón MD     Date: 10/12/2018  Time: 6:16 PM

## 2018-10-12 NOTE — ANESTHESIA PROCEDURE NOTES
Airway  Urgency: elective    Airway not difficult    General Information and Staff    Patient location during procedure: OR    Indications and Patient Condition  Indications for airway management: airway protection    Preoxygenated: yes  MILS not maintained throughout  Mask difficulty assessment: 1 - vent by mask    Final Airway Details  Final airway type: endotracheal airway      Successful airway: ETT  Cuffed: yes   Successful intubation technique: direct laryngoscopy  Endotracheal tube insertion site: oral  Blade: Luis Eduardo  Blade size: 3  ETT size: 7.0 mm  Cormack-Lehane Classification: grade I - full view of glottis  Placement verified by: chest auscultation and capnometry   Number of attempts at approach: 1    Additional Comments  Negative epigastric sounds, Breath sound equal bilaterally with symmetric chest rise and fall

## 2018-10-12 NOTE — PLAN OF CARE
Problem: Patient Care Overview  Goal: Plan of Care Review  Outcome: Ongoing (interventions implemented as appropriate)   10/12/18 0334   Coping/Psychosocial   Plan of Care Reviewed With patient;mother   Plan of Care Review   Progress improving   OTHER   Outcome Summary VSS no overnight events. rested well. NPO for lap maddy/bile duct exploration by Dr. Escalante/Dr. Calderón today. some pain improved with prn meds. mother at bedside.      Goal: Individualization and Mutuality  Outcome: Ongoing (interventions implemented as appropriate)    Goal: Discharge Needs Assessment  Outcome: Ongoing (interventions implemented as appropriate)    Goal: Interprofessional Rounds/Family Conf  Outcome: Ongoing (interventions implemented as appropriate)

## 2018-10-13 PROCEDURE — 99232 SBSQ HOSP IP/OBS MODERATE 35: CPT | Performed by: NURSE PRACTITIONER

## 2018-10-13 PROCEDURE — 25010000002 MEROPENEM: Performed by: INTERNAL MEDICINE

## 2018-10-13 PROCEDURE — 25010000002 KETOROLAC TROMETHAMINE PER 15 MG: Performed by: SURGERY

## 2018-10-13 RX ADMIN — Medication 3 ML: at 20:39

## 2018-10-13 RX ADMIN — HYDROCODONE BITARTRATE AND ACETAMINOPHEN 1 TABLET: 7.5; 325 TABLET ORAL at 22:17

## 2018-10-13 RX ADMIN — MEROPENEM 1 G: 1 INJECTION, POWDER, FOR SOLUTION INTRAVENOUS at 17:39

## 2018-10-13 RX ADMIN — HYDROCODONE BITARTRATE AND ACETAMINOPHEN 1 TABLET: 7.5; 325 TABLET ORAL at 14:33

## 2018-10-13 RX ADMIN — HYDROCODONE BITARTRATE AND ACETAMINOPHEN 1 TABLET: 7.5; 325 TABLET ORAL at 08:19

## 2018-10-13 RX ADMIN — DEXTROSE AND SODIUM CHLORIDE 125 ML/HR: 5; 900 INJECTION, SOLUTION INTRAVENOUS at 06:00

## 2018-10-13 RX ADMIN — MEROPENEM 1 G: 1 INJECTION, POWDER, FOR SOLUTION INTRAVENOUS at 10:43

## 2018-10-13 RX ADMIN — HYDROCODONE BITARTRATE AND ACETAMINOPHEN 1 TABLET: 7.5; 325 TABLET ORAL at 17:40

## 2018-10-13 RX ADMIN — DOCUSATE SODIUM AND SENNOSIDES 2 TABLET: 50; 8.6 TABLET ORAL at 20:39

## 2018-10-13 RX ADMIN — PANTOPRAZOLE SODIUM 40 MG: 40 INJECTION, POWDER, FOR SOLUTION INTRAVENOUS at 06:00

## 2018-10-13 RX ADMIN — MEROPENEM 1 G: 1 INJECTION, POWDER, FOR SOLUTION INTRAVENOUS at 01:34

## 2018-10-13 RX ADMIN — KETOROLAC TROMETHAMINE 30 MG: 30 INJECTION, SOLUTION INTRAMUSCULAR at 02:23

## 2018-10-13 NOTE — PLAN OF CARE
Problem: Patient Care Overview  Goal: Plan of Care Review  Outcome: Ongoing (interventions implemented as appropriate)   10/13/18 0445   Coping/Psychosocial   Plan of Care Reviewed With patient   Plan of Care Review   Progress improving   OTHER   Outcome Summary VSS, pain controlled with oral and IV medications. UOP-WNL. anticipate d/c today. Pt slept well throughout the night.      Goal: Individualization and Mutuality  Outcome: Ongoing (interventions implemented as appropriate)    Goal: Discharge Needs Assessment  Outcome: Ongoing (interventions implemented as appropriate)    Goal: Interprofessional Rounds/Family Conf  Outcome: Ongoing (interventions implemented as appropriate)      Problem: Pain, Acute (Adult)  Goal: Acceptable Pain Control/Comfort Level  Outcome: Ongoing (interventions implemented as appropriate)

## 2018-10-13 NOTE — PROGRESS NOTES
"General Surgery Daily Progress Note    10/13/18    Piyush Wren  [id number]  1995    1 Day Post-Op    Reason for Evaluation: Acute cholecystitis, choledocholithiasis  Subjective:  States feels much better.  Pain well controlled.  No nausea.  Tolerated soft diet this morning    Objective:  /62   Pulse 66   Temp 97.7 °F (36.5 °C) (Oral)   Resp 18   Ht 170.2 cm (67\")   Wt 103 kg (226 lb 1.6 oz)   LMP 10/09/2018 Comment: neg hcg 10/6/2018  SpO2 97%   BMI 35.41 kg/m²       INTAKE/OUTPUT      Intake/Output Summary (Last 24 hours) at 10/13/18 1058  Last data filed at 10/13/18 0922   Gross per 24 hour   Intake          4301.17 ml   Output             3345 ml   Net           956.17 ml       General Appearance: No acute distress  Eyes: Anicteric  Neck: Trachea midline   Cardiovascular:  RRR  Lungs:  Bilateral respirations unlabored   Abdomen:  Soft and appropriately tender abdomen  Extremities:  No cyanosis or edema   Skin: No jaundice   Neurologic: awake and conversant   Surgical Site: Incisions without signs of infection or bleeding      Imaging Results (last 24 hours)     ** No results found for the last 24 hours. **          Labs:  Lab Results (last 24 hours)     Procedure Component Value Units Date/Time    Tissue Pathology Exam [[id number]] Collected:  10/12/18 1739    Specimen:  Tissue from Gallbladder Updated:  10/13/18 0446    POC Glucose Once [[id number]]  (Normal) Collected:  10/12/18 1441    Specimen:  Blood Updated:  10/12/18 1447     Glucose 88 mg/dL     Narrative:       Meter: [id number] : [id number] Shad Joyce    POC Pregnancy, Urine [[id number]]  (Normal) Collected:  10/12/18 1400    Specimen:  Urine Updated:  10/12/18 1401     HCG, Urine, QL Negative     Lot Number [id number]     Internal Positive Control Presumptive Positive     Internal Negative Control Negative            Assessment:  Acute cholecystitis with choledocholithiasis.    Plan:   Antibiotics can be discontinued after " today.  Remove drain today.  Advanced diet and activity with consideration of discharge tomorrow if continues to improve    Surendra Calderón MD - 10/13/2018, 10:58 AM

## 2018-10-13 NOTE — OP NOTE
CHOLECYSTECTOMY LAPAROSCOPIC INTRAOPERATIVE CHOLANGIOGRAM, COMMON BILE DUCT EXPLORATION  Procedure Report    Patient Name:  Piyush Wren  YOB: 1995    Date of Surgery:  10/12/18 8:20 PM     Indications: This patient presented with signs and symptoms consistent with acute cholecystitis.  Her evaluation revealed a large common bile duct stone.  ERCP was performed stone was unable to be extracted and therefore stent was placed.  Given her presentation and her findings of ERCP was felt she would benefit from laparoscopic cholecystectomy with laparoscopic common bile duct exploration.  She been managed by my partner Dr. Escalante and because of my hepatobiliary training I was asked to perform her procedure with his assistance.  She was explained at length risks and benefits that procedure.  She exposed understanding was anxious to proceed    Pre-op Diagnosis: Acute cholecystitis  Choledocholithiasis    Post-op Diagnosis: Same     Procedure:  Laparoscopic cholecystectomy  Laparoscopic common bile duct exploration    Surgeon: Surendra Calderón MD    Staff:  Surgeon(s):  Derek Escalante MD McKenzie, Shaun Patrick, MD         Anesthesia: General    Estimated Blood Loss: 50 mL    Implants:    Nothing was implanted during the procedure    Specimen:          ID Type Source Tests Collected by Time   A : gallbladder with stones Tissue Gallbladder TISSUE PATHOLOGY EXAM Surendra Calderón MD 10/12/2018 8205         Findings: 1.  The patient had an inflamed and partially intrahepatic gallbladder that was difficult to mobilize to a point that a critical view of safety could be obtained.  Consequently a fundus first approach was utilized for resection.  2.  Patient had purulent debris within the gallbladder and adjacent gallbladder wall consistent with acute cholecystitis  3.  Despite a large inflamed cystic duct we were unable to pass a suction catheter or [place] through the ducts to a point  that we felt confident that the large gallstone which could be clearly visualized and the common bile duct could be removed.  Consequently a longitudinal anterior duct ductotomy was created with stone extraction with excellent result the remainder the bile duct appeared normal with stent clearly visualized and in place.  4.  The bile duct itself was quite large likely from patients chronic obstruction.  Given its size and the fact that there was a stent already in position the repair of the common bile duct was performed without the use of a T-tube.    5.  Given the complexities this operation and the need to perform a complex biliary procedure both Dr. Youssef and myself were scrubbed throughout the entire operation.  His assistance was critical to the success of this operation in terms of assistance with exposure retraction 3-D visualization, surgical decision making and bile duct repair    Complications: None immediate    Description of Procedure: After obtaining informed consent the patient she was brought to the operative suite where she underwent general endotracheal anesthesia without complication sequential compression devices and orogastric tube were placed her abdomen was prepped and draped in sterile fashion.  Intravenous antibiotics had been given therapeutically prior surgery her name and planned procedure verified prior to surgery.    After infusion of local anesthesia at all the entry points the abdomen a varies needle was placed left upper quadrant at Healy's point.  The abdomen was safely insufflated then using a modified cutdown technique a 12 mm port was placed in the infraumbilical region.  25 mL ports are placed in the standard lateral configuration for cholecystectomy 5 mm port was placed in the epigastric region but this was eventually upsized to allow for repair of the common bile duct.  The gallbladder was retracted superiorly with the infundibulum exposed and retracted laterally patient  had a very large dilated and redundant gallbladder.  Dissection was performed in the triangle of Calot that the critical view of safety could not clearly be obtained.  Given the known need to extract the stone from the common bile duct decision was made to perform a fundus first approach to allow the ability to pull the very redundant cystic duct as seen on MRCP and a more straight configuration.  Using the LigaSure device Bovie Cartery and at times modified clamp crush technique given the intrahepatic component of the gallbladder the gallbladder was retracted resected off of the gallbladder fossa the liver.  Eventually we reached a point where the cystic artery could be divided with the LigaSure device we ultimately placed a clip on the stump prior to closure the abdomen.  We made a very large cystic duct ductotomy and attempted to milk the gallstone towards that structure we were unable to truly enter safely the common bile duct through that orifice and it appeared to be quite scarred nor did we ever see any bile emanating from the cystic duct orifice.  Given the size of the common bile duct which was dissected from surrounding peritoneal attachments with the LigaSure device and the node stent within the duct I decided that the best course of action was standard common bile duct exploration with ductotomy.  At the 12 o'clock position approximately 1-1/2 cm ductotomy was created.  When this was performed the stone was clearly visualized and extracted completely intact.  We could even see the stent in appropriate location in the more posterior aspect of the duct.  Given the patient's MRCP showed only one large filling defect and no smaller defects in the fact the patient had a large duct and had already had ERCP and stent placement I elected not to attempt to perform a intraoperative cholangiogram given the difficulties of keeping all the contrast within the duct itself.  Therefore we completed the amputation of the  cystic duct and close this with a Endoloop.  Again at no point did we ever see any bile emanating from the cystic duct stump itself.  I then repaired the common bile duct with interrupted 4-0 PDS suture with good result.  There was no signs of bile leakage after irrigation and no signs of any obvious bleeding.  The stone and gallbladder were then both placed in an Endo Catch bag and removed through the periumbilical port.  The fat port site and the 12 mm port site were closed with the assistance of suture closure device and 0 Vicryl suture.  The 5 mL ports were also closed with subcuticular suture prior to closure the abdomen however we did place a 15 round Samuel drain in the gallbladder fossa bed given the complicated bile duct repair.  That drain was sutured in position.  The patient was laterally from anesthesia at that point without complication.  There are no complications during the case.  All counts are correct at the end of the case.  I performed the entire procedure with Dr. Escalante's assistance throughout the entire operation      Surendra Calderón MD     Date: 10/12/2018  Time: 8:20 PM

## 2018-10-13 NOTE — PROGRESS NOTES
[facility] Medicine Services  PROGRESS NOTE    Patient Name: Piyush Wren  : 1995  MRN: [id number]    Date of Admission: 10/6/2018  Length of Stay: 6  Primary Care Physician: Provider, No Known    Subjective   Subjective     CC:  Abdominal pain    HPI:   Sitting up in bed, NAD. Mom at bedside. Pain is well controlled. Eating bland diet without any complications. Has been walking in [place]. + flatus but no BM. No new issues     Review of Systems  Gen- No fevers, chills  CV- No chest pain, palpitations  Resp- No cough, dyspnea  GI- as above    Otherwise ROS is negative except as mentioned in the HPI.    Objective   Objective     Vital Signs:   Temp:  [97.6 °F (36.4 °C)-98.7 °F (37.1 °C)] 97.6 °F (36.4 °C)  Heart Rate:  [] 57  Resp:  [16-18] 18  BP: (109-135)/(49-73) 117/66        Physical Exam:  Constitutional: No acute distress, awake, alert, obese WF  HENT: NCAT, mucous membranes moist  Respiratory: Clear to auscultation bilaterally, respiratory effort normal   Cardiovascular: RRR, no murmurs, rubs, or gallops, palpable pedal pulses bilaterally  Gastrointestinal: mildly tender to light palpation, + BS, ND; RLQ JULIAN drain with small amt of serosanguinous drainage; lap sites x 3 with CDI dermabond   Musculoskeletal: No bilateral ankle edema  Psychiatric: Appropriate affect, cooperative  Neurologic: Oriented x 3, strength symmetric in all extremities, Cranial Nerves grossly intact to confrontation, speech clear  Skin: No rashes    Results Reviewed:  I have personally reviewed current lab, radiology, and data and agree.      Results from last 7 days  Lab Units 10/11/18  0511 10/10/18  0355 10/09/18  0657   WBC 10*3/mm3 14.44* 16.18* 16.00*   HEMOGLOBIN g/dL 11.1* 10.8* 11.4*   HEMATOCRIT % 35.1 34.0* 35.3   PLATELETS 10*3/mm3 300 276 261       Results from last 7 days  Lab Units 10/11/18  0511 10/10/18  2004 10/10/18  0355 10/09/18  0657   SODIUM mmol/L 134  --  134 138    POTASSIUM mmol/L 4.0 3.9 3.3* 3.5   CHLORIDE mmol/L 100  --  102 107   CO2 mmol/L 22.0  --  22.0 23.0   BUN mg/dL <5*  --  <5* 6*   CREATININE mg/dL 0.50*  --  0.54* 0.52*   GLUCOSE mg/dL 66*  --  73 81   CALCIUM mg/dL 8.6*  --  8.5* 8.0*   ALT (SGPT) U/L 193*  --  274* 317*   AST (SGOT) U/L 43*  --  112* 193*     Estimated Creatinine Clearance: 217.9 mL/min (A) (by C-G formula based on SCr of 0.5 mg/dL (L)).  No results found for: BNP    Microbiology Results Abnormal     None          Imaging Results (last 24 hours)     ** No results found for the last 24 hours. **             I have reviewed the medications.      bisacodyl 10 mg Rectal Daily   meropenem 1 g Intravenous Q8H   sennosides-docusate sodium 2 tablet Oral Nightly   sodium chloride 3 mL Intravenous Q12H         Assessment/Plan   Assessment / Plan     Active Hospital Problems    Diagnosis   • **Choledocholithiasis   • Elevated liver enzymes   • Hyperbilirubinemia   • Cholelithiasis   • Intractable abdominal pain   • Jaundice          Brief Hospital Course to date:  Piyush Wren is a 23 y.o. female with no PMH presents with abdominal pain- found to have elevated LFTs and hyperbilirubinemia in the ED.  CT A/P showed ductal dilatation and MRCP revealed obstructing stone in CBD.  She was admitted to our service and GI was consulted.       Assessment & Plan:  --Choledocholithiasis- s/p ERCP with stent placement, GI unable to remove stone.  HIDA consistent with cholecystitis. General Surgery consulted, had lap maddy with stone extraction and JULIAN placement on 10/12/18 without any immediate complications, JULIAN to be removed today per surgery.   --Elevated LFTs and hyperbilirubinemia-due to above; improving, monitor   --possibly home in AM per surgery     DVT Prophylaxis:  mechanical    CODE STATUS:   Code Status and Medical Interventions:   Ordered at: 10/07/18 0237     Level Of Support Discussed With:    Patient     Code Status:    CPR     Medical  Interventions (Level of Support Prior to Arrest):    Full       Disposition: I expect the patient to be discharged TBD      Electronically signed by JOE Blevins, 10/13/18, 1:30 PM.

## 2018-10-13 NOTE — PLAN OF CARE
Problem: Pain, Acute (Adult)  Goal: Acceptable Pain Control/Comfort Level  Outcome: Ongoing (interventions implemented as appropriate)   10/13/18 1301   Pain, Acute (Adult)   Acceptable Pain Control/Comfort Level making progress toward outcome

## 2018-10-14 VITALS
HEIGHT: 67 IN | TEMPERATURE: 98 F | HEART RATE: 75 BPM | BODY MASS INDEX: 35.49 KG/M2 | DIASTOLIC BLOOD PRESSURE: 67 MMHG | SYSTOLIC BLOOD PRESSURE: 120 MMHG | RESPIRATION RATE: 16 BRPM | OXYGEN SATURATION: 98 % | WEIGHT: 226.1 LBS

## 2018-10-14 LAB
ALBUMIN SERPL-MCNC: 3.3 G/DL (ref 3.2–4.8)
ALBUMIN/GLOB SERPL: 1.6 G/DL (ref 1.5–2.5)
ALP SERPL-CCNC: 96 U/L (ref 25–100)
ALT SERPL W P-5'-P-CCNC: 92 U/L (ref 7–40)
ANION GAP SERPL CALCULATED.3IONS-SCNC: 6 MMOL/L (ref 3–11)
AST SERPL-CCNC: 28 U/L (ref 0–33)
BILIRUB SERPL-MCNC: 0.7 MG/DL (ref 0.3–1.2)
BUN BLD-MCNC: <5 MG/DL (ref 9–23)
BUN/CREAT SERPL: ABNORMAL (ref 7–25)
CALCIUM SPEC-SCNC: 8.9 MG/DL (ref 8.7–10.4)
CHLORIDE SERPL-SCNC: 106 MMOL/L (ref 99–109)
CO2 SERPL-SCNC: 28 MMOL/L (ref 20–31)
CREAT BLD-MCNC: 0.62 MG/DL (ref 0.6–1.3)
DEPRECATED RDW RBC AUTO: 42.2 FL (ref 37–54)
ERYTHROCYTE [DISTWIDTH] IN BLOOD BY AUTOMATED COUNT: 13.7 % (ref 11.3–14.5)
GFR SERPL CREATININE-BSD FRML MDRD: 120 ML/MIN/1.73
GLOBULIN UR ELPH-MCNC: 2.1 GM/DL
GLUCOSE BLD-MCNC: 80 MG/DL (ref 70–100)
HCT VFR BLD AUTO: 33.1 % (ref 34.5–44)
HGB BLD-MCNC: 10.4 G/DL (ref 11.5–15.5)
MCH RBC QN AUTO: 26.8 PG (ref 27–31)
MCHC RBC AUTO-ENTMCNC: 31.4 G/DL (ref 32–36)
MCV RBC AUTO: 85.3 FL (ref 80–99)
PLATELET # BLD AUTO: 374 10*3/MM3 (ref 150–450)
PMV BLD AUTO: 9.6 FL (ref 6–12)
POTASSIUM BLD-SCNC: 4.3 MMOL/L (ref 3.5–5.5)
PROT SERPL-MCNC: 5.4 G/DL (ref 5.7–8.2)
RBC # BLD AUTO: 3.88 10*6/MM3 (ref 3.89–5.14)
SODIUM BLD-SCNC: 140 MMOL/L (ref 132–146)
WBC NRBC COR # BLD: 12.36 10*3/MM3 (ref 3.5–10.8)

## 2018-10-14 PROCEDURE — 80053 COMPREHEN METABOLIC PANEL: CPT | Performed by: SURGERY

## 2018-10-14 PROCEDURE — 85027 COMPLETE CBC AUTOMATED: CPT | Performed by: SURGERY

## 2018-10-14 PROCEDURE — 99239 HOSP IP/OBS DSCHRG MGMT >30: CPT | Performed by: NURSE PRACTITIONER

## 2018-10-14 RX ORDER — DOCUSATE SODIUM 100 MG/1
100 CAPSULE, LIQUID FILLED ORAL 2 TIMES DAILY
Qty: 20 CAPSULE | Refills: 0
Start: 2018-10-14 | End: 2021-05-20

## 2018-10-14 RX ORDER — HYDROCODONE BITARTRATE AND ACETAMINOPHEN 5; 325 MG/1; MG/1
1 TABLET ORAL EVERY 6 HOURS PRN
Qty: 15 TABLET | Refills: 0
Start: 2018-10-14 | End: 2018-10-22

## 2018-10-14 RX ADMIN — HYDROCODONE BITARTRATE AND ACETAMINOPHEN 1 TABLET: 7.5; 325 TABLET ORAL at 03:55

## 2018-10-14 RX ADMIN — HYDROCODONE BITARTRATE AND ACETAMINOPHEN 1 TABLET: 7.5; 325 TABLET ORAL at 12:21

## 2018-10-14 RX ADMIN — Medication 3 ML: at 09:49

## 2018-10-14 NOTE — PLAN OF CARE
Problem: Patient Care Overview  Goal: Plan of Care Review  Outcome: Ongoing (interventions implemented as appropriate)   10/14/18 0407   Coping/Psychosocial   Plan of Care Reviewed With patient   Plan of Care Review   Progress improving   OTHER   Outcome Summary VSS, pain controlled with oral meds. UOP-WNL. pt slept well throughout the night. anticipate d/c today.      Goal: Individualization and Mutuality  Outcome: Ongoing (interventions implemented as appropriate)    Goal: Discharge Needs Assessment  Outcome: Ongoing (interventions implemented as appropriate)    Goal: Interprofessional Rounds/Family Conf  Outcome: Ongoing (interventions implemented as appropriate)      Problem: Pain, Acute (Adult)  Goal: Acceptable Pain Control/Comfort Level  Outcome: Ongoing (interventions implemented as appropriate)

## 2018-10-14 NOTE — PROGRESS NOTES
"General Surgery Daily Progress Note    10/14/18    Piyush Wren  [id number]  1995    2 Days Post-Op    Reason for Evaluation: Choledocholithiasis, acute cholecystitis  Subjective:  Patient feels much better and is anxious for discharge today    Objective:  /67 (BP Location: Left arm, Patient Position: Lying)   Pulse 78   Temp 98.1 °F (36.7 °C) (Oral)   Resp 16   Ht 170.2 cm (67\")   Wt 103 kg (226 lb 1.6 oz)   LMP 10/09/2018 Comment: neg hcg 10/6/2018  SpO2 98%   BMI 35.41 kg/m²       INTAKE/OUTPUT      Intake/Output Summary (Last 24 hours) at 10/14/18 1138  Last data filed at 10/14/18 0800   Gross per 24 hour   Intake             2040 ml   Output             3500 ml   Net            -1460 ml       General Appearance: No acute distress  Eyes: Anicteric  Neck: Trachea midline   Cardiovascular:  RRR  Lungs:  Bilateral respirations unlabored   Abdomen:  Abdomen is soft with minimal incisional tenderness  Extremities:  No cyanosis or edema   Skin: No jaundice  Neurologic: awake and conversant   Surgical Site: Minimal drainage on drain site dressing.  Other incisions appear to be healing appropriately      Imaging Results (last 24 hours)     ** No results found for the last 24 hours. **          Labs:  Lab Results (last 24 hours)     Procedure Component Value Units Date/Time    Comprehensive Metabolic Panel [[id number]]  (Abnormal) Collected:  10/14/18 0552    Specimen:  Blood Updated:  10/14/18 0636     Glucose 80 mg/dL      BUN <5 (L) mg/dL      Creatinine 0.62 mg/dL      Sodium 140 mmol/L      Potassium 4.3 mmol/L      Chloride 106 mmol/L      CO2 28.0 mmol/L      Calcium 8.9 mg/dL      Total Protein 5.4 (L) g/dL      Albumin 3.30 g/dL      ALT (SGPT) 92 (H) U/L      AST (SGOT) 28 U/L      Alkaline Phosphatase 96 U/L      Total Bilirubin 0.7 mg/dL      eGFR Non African Amer 120 mL/min/1.73      Globulin 2.1 gm/dL      A/G Ratio 1.6 g/dL      BUN/Creatinine Ratio --     Comment: Unable to calculate " Bun/Crea Ratio.        Anion Gap 6.0 mmol/L     Narrative:       [employer] Guidelines    Stage     Description        GFR  1         Normal or High     90+  2         Mild decrease      60-89  3         Moderate decrease  30-59  4         Severe decrease    15-29  5         Kidney failure     <15    The MDRD GFR formula is only valid for adults with stable renal function between ages 18 and 70.    CBC (No Diff) [[id number]]  (Abnormal) Collected:  10/14/18 0552    Specimen:  Blood Updated:  10/14/18 0628     WBC 12.36 (H) 10*3/mm3      RBC 3.88 (L) 10*6/mm3      Hemoglobin 10.4 (L) g/dL      Hematocrit 33.1 (L) %      MCV 85.3 fL      MCH 26.8 (L) pg      MCHC 31.4 (L) g/dL      RDW 13.7 %      RDW-SD 42.2 fl      MPV 9.6 fL      Platelets 374 10*3/mm3             Assessment:  Acute cholecystitis with choledocholithiasis    Plan:   Stable for discharge home from a surgical standpoint.  See no need to continue antibiotics for an extended period of time.  Postoperative instructions provided in detail.  Return to clinic in 2-3 weeks.  Dry dressing over drain Tract until healed and scabbed closed  Recommend daily stool softener until patient is no longer requiring narcotics  Surendra Calderón MD - 10/14/2018, 11:38 AM

## 2018-10-14 NOTE — DISCHARGE SUMMARY
[facility] Medicine Services  DISCHARGE SUMMARY    Patient Name: Piyush Wren  : 1995  MRN: [id number]    Date of Admission: 10/6/2018  Date of Discharge:  10/14/18  Primary Care Physician: Provider, No Known    Consults     Date and Time Order Name Status Description    10/7/2018 1255 Inpatient General Surgery Consult Completed     10/7/2018 0317 Inpatient Gastroenterology Consult Completed         Hospital Course     Presenting Problem:   Jaundice [R17]    Active Hospital Problems    Diagnosis Date Noted   • **Choledocholithiasis [K80.50] 10/07/2018   • Elevated liver enzymes [R74.8] 10/07/2018   • Hyperbilirubinemia [E80.6] 10/07/2018   • Cholelithiasis [K80.20] 10/07/2018   • Intractable abdominal pain [R10.9] 10/07/2018   • Jaundice [R17] 10/07/2018      Resolved Hospital Problems    Diagnosis Date Noted Date Resolved   No resolved problems to display.          Hospital Course:  Piyush Wren is a 23 y.o. female with no PMH presents with abdominal pain- found to have elevated LFTs and hyperbilirubinemia in the ED.  CT A/P showed ductal dilatation and MRCP revealed obstructing stone in CBD.  She was admitted to our service and GI was consulted. Had ERCP for choledocholithiasis with stent placement but stone removal was unsuccessful. HIDA consistent with cholecystitis. General surgery consulted and performed lap cholecystectomy with stone extraction on 10/12/18 without any immediate postoperative complications. Has had resolution of hyperbilirubinemia and transaminitis. Received IV Merrem x 2 days, but surgery does not recommend any additional antibiotic therapy at this time. Is medically ready for discharge, and will be discharged home with mother today. Follow up with GI outpt with EGD in 2-3 weeks for stent removal, GI to call and make arrangements with patient. Follow up PCP in 1 week. Follow up with Dr Calderón in 2-3 weeks. Keep dry dressing over JULIAN insertion site  until healed and scabbed closed. Needs to take stool softeners daily while taking narcotics. Postoperative instructions provided to pateint and mother in detail by surgery.        Discharge Follow Up Recommendations for labs/diagnostics:  EDG for stent removal in 2 weeks with Dr Cunningham       Day of Discharge     HPI:   Sitting up in bed, mom in room. NAD. Eager to go home. Pain well controlled. Eating well, no BM yet, but passing flatus. Has been walking in [place] this morning. Denies f/c, n/v/d, soa or cp     Review of Systems  Otherwise ROS is negative except as mentioned in the HPI.    Vital Signs:   Temp:  [98.1 °F (36.7 °C)-98.3 °F (36.8 °C)] 98.1 °F (36.7 °C)  Heart Rate:  [74-88] 78  Resp:  [16-18] 16  BP: (115-126)/(60-76) 115/67     Physical Exam:  Constitutional: No acute distress, awake, alert  Eyes: PERRLA, sclerae anicteric, no conjunctival injection  HENT: NCAT, mucous membranes moist  Neck: Supple, no thyromegaly, no lymphadenopathy, trachea midline  Respiratory: Clear to auscultation bilaterally, nonlabored respirations   Cardiovascular: RRR, no murmurs, rubs, or gallops, palpable pedal pulses bilaterally  Gastrointestinal: Positive bowel sounds, soft, appropriately tender to touch at lap sites, nondistended, obese, lap sites x 4 CDI dermabond; RLQ JULIAN insertion site with CDI dressing, no visible drainage   Musculoskeletal: No bilateral ankle edema, no clubbing or cyanosis to extremities  Psychiatric: Appropriate affect, cooperative  Neurologic: Oriented x 3, strength symmetric in all extremities, Cranial Nerves grossly intact to confrontation, speech clear  Skin: No rashes     Pertinent  and/or Most Recent Results       Results from last 7 days  Lab Units 10/14/18  0552 10/11/18  0511 10/10/18  2004 10/10/18  0355 10/09/18  0657 10/08/18  0600   WBC 10*3/mm3 12.36* 14.44*  --  16.18* 16.00* 11.40*   HEMOGLOBIN g/dL 10.4* 11.1*  --  10.8* 11.4* 11.9   HEMATOCRIT % 33.1* 35.1  --  34.0* 35.3 37.3    PLATELETS 10*3/mm3 374 300  --  276 261 304   SODIUM mmol/L 140 134  --  134 138 136   POTASSIUM mmol/L 4.3 4.0 3.9 3.3* 3.5 4.0   CHLORIDE mmol/L 106 100  --  102 107 102   CO2 mmol/L 28.0 22.0  --  22.0 23.0 25.0   BUN mg/dL <5* <5*  --  <5* 6* 6*   CREATININE mg/dL 0.62 0.50*  --  0.54* 0.52* 0.65   GLUCOSE mg/dL 80 66*  --  73 81 80   CALCIUM mg/dL 8.9 8.6*  --  8.5* 8.0* 8.7       Results from last 7 days  Lab Units 10/14/18  0552 10/11/18  0511 10/10/18  0355 10/09/18  0657 10/08/18  0600   BILIRUBIN mg/dL 0.7 1.3* 1.4* 1.6* 1.9*   ALK PHOS U/L 96 120* 135* 134* 174*   ALT (SGPT) U/L 92* 193* 274* 317* 246*   AST (SGOT) U/L 28 43* 112* 193* 155*           Invalid input(s): TG, LDLCALC, LDLREALC      Brief Urine Lab Results  (Last result in the past 365 days)      Color   Clarity   Blood   Leuk Est   Nitrite   Protein   CREAT   Urine HCG        10/12/18 1400               Negative           Microbiology Results Abnormal     None          Imaging Results (all)     Procedure Component Value Units Date/Time    US Gallbladder [[id number]] Collected:  10/10/18 0920     Updated:  10/11/18 1306    Narrative:       EXAMINATION: US GALLBLADDER-     INDICATION: Abdominal pain; R74.8-Abnormal levels of other serum  enzymes; K80.51-Calculus of bile duct without cholangitis or  cholecystitis with obstruction.      TECHNIQUE: Ultrasound right upper quadrant abdomen     COMPARISON: Ultrasound dated 10/5/2018, MRI abdomen 10/7/2018     FINDINGS:      Visualized portions of the pancreas within normal limits.  Liver demonstrates normal echogenicity and echotexture with homogeneous  appearance of the parenchyma. No focal liver lesion.  Gallbladder is present containing multiple echogenic foci with shadowing  consistent cholelithiasis. Gallbladder wall thickening up to 7 mm is  additionally present.     Common bile duct measures 3 mm in diameter at the level the vipul this.     Right kidney measures 11.4 cm in length without  evidence of  hydronephrosis, contour deforming mass or obvious calculi.          Impression:       1. Cholelithiasis in a mildly contracted gallbladder now with  gallbladder wall thickening up to 7 mm concerning for acute  cholecystitis.  2. Common bile duct within normal limits.  3. No ascites.         This report was finalized on 10/11/2018 1:04 PM by Dr. Sher Garcia.       NM Hepatobiliary Without CCK [[id number]] Collected:  10/10/18 1707     Updated:  10/10/18 1712    Narrative:       EXAMINATION: NM HEPATOBILIARY WITHOUT CCK-      INDICATION: RUQ pain, cholecystitis suspected; R74.8-Abnormal levels of  other serum enzymes; K80.51-Calculus of bile duct without cholangitis or  cholecystitis with obstruction     TECHNIQUE:  Anterior projection images were obtained for a total of 60  minutes over the abdomen following the uneventful administration of 7.0  mCi technetium-99m Choletec IV. After failure of the gallbladder to fill  after 1 and her, 4 mg morphine were administered and an additional 35  minutes imaging was performed.     COMPARISONS:  No prior HIDA scan of record. Correlated with right upper  quadrant sonogram 10/9/2018.     FINDINGS:  There is satisfactory extraction of radiotracer in the blood  pool.  The bile ducts are visible after 5 minutes.  The small bowel is  visible after 5 minutes. The gallbladder does not become opacified  spontaneously nor after morphine injection.       Impression:          1. Scintigraphic findings compatible with acute cholecystitis.        This report was finalized on 10/10/2018 5:10 PM by Preet Luis.       FL ERCP pancreatic and biliary ducts [[id number]] Collected:  10/07/18 1540     Updated:  10/07/18 1850    Narrative:       EXAMINATION: FL ERCP PANCREATIC AND BILIARY DUCTS - 10/7/2018     INDICATION: R74.8-Abnormal levels of other serum enzymes;  K80.51-Calculus of bile duct without cholangitis or cholecystitis with  obstruction.     TECHNIQUE:  Fluoroscopic images  were provided for a clinical service  without radiologist present.  The fluoroscopy time was 14 minutes 5  seconds. 4 spot images were provided.     COMPARISONS:  MRCP 7 hours.       Impression:       FINDINGS/IMPRESSION: Imaging provided for ERCP.  See procedure note for  details.     DICTATED:   10/7/2018  EDITED/ls :   10/7/2018         This report was finalized on 10/7/2018 6:48 PM by Preet Luis.       MRI abdomen wo contrast mrcp [[id number]] Collected:  10/07/18 1149     Updated:  10/07/18 1450    Narrative:       EXAMINATION: MRI ABDOMEN WO CONTRAST MRCP - 10/7/2018     INDICATION: R74.8-Abnormal levels of other serum enzymes.     TECHNIQUE:  Noncontrast MRI of the abdomen was performed utilizing MRCP  protocol.     COMPARISONS:  CT abdomen/pelvis 4 hours prior.     FINDINGS:  Marked intrahepatic and extrahepatic biliary ductal  dilatation is seen with a stone in the mid common bile duct. There are  stones in the distended gallbladder. No pericholecystic inflammatory  change. There is normal background signal without steatosis or  cirrhosis. No focal lesion is seen on the T2-weighted series. Pancreas  is normal without evidence of ductal dilatation or divisum. The adrenal  glands, kidneys and spleen are unremarkable. No dilated small or large  bowel is seen in the imaged field of view. No enlarged lymph nodes or  free fluid is seen in the image field of view.       Impression:       1. Obstructing calculus in the common bile duct causing intrahepatic and  extrahepatic biliary ductal dilatation.  2. Cholelithiasis without evidence of acute cholecystitis.     DICTATED:   10/07/2018  EDITED/ls :   10/07/2018      This report was finalized on 10/7/2018 2:48 PM by Preet Luis.       CT Abdomen Pelvis With Contrast [[id number]] Collected:  10/06/18 2327     Updated:  10/07/18 0132    Narrative:       EXAM:    CT Abdomen and Pelvis With Intravenous Contrast    EXAM DATE/TIME:    10/6/2018 11:27 PM    CLINICAL  HISTORY:    23 years old, female; Elevated liver enzymes. Epigastric pain, n/v,   constipation    TECHNIQUE:    Axial computed tomography images of the abdomen and pelvis with intravenous   contrast.  All CT scans at this facility use at least one of these dose   optimization techniques: automated exposure control; mA and/or kV adjustment   per patient size (includes targeted exams where dose is matched to clinical   indication); or iterative reconstruction.    Coronal and sagittal reformatted images were created and reviewed.    CONTRAST:    100 mL of iso 300 administered intravenously.      COMPARISON:    US GALLBLADDER 10/5/2018 1:03 PM    FINDINGS:    There is moderate to severe diffuse intra-and extrahepatic biliary   dilatation, the common bile duct measuring up to 1.9 cm. the gallbladder is   distended, measuring up to 11 cm.  No pericholecystic inflammation.  No   visualized gallstone or biliary stone.      The common bile duct tapers abruptly within the pancreatic head.  However,   there is no visualized pancreatic lesion.  There is no acute pancreatitis or   pancreatic ductal dilatation.      The lung bases, liver, spleen, adrenal glands, kidneys, uterus, ovaries, and   urinary bladder are normal.      There is no bowel inflammation, obstruction, free intraperitoneal air, or   ascites.  The appendix is normal.      No acute osseous abnormality.      Impression:         Diffuse dilatation of the biliary tree and gallbladder without visualized   stone or pericholecystic inflammation.      Abrupt tapering of the common bile duct within the pancreatic head without   visualized pancreatic lesion.  No pancreatic ductal dilatation.  Findings could   reflect a stricture or a nonvisualized pancreatic lesion.  GI evaluation   recommended.    THIS DOCUMENT HAS BEEN ELECTRONICALLY SIGNED BY TOBY FORTUNE MD                          Order Current Status    [place] Draw In process    Tissue Pathology Exam In process         Discharge Details        Discharge Medications      New Medications      Instructions Start Date   docusate sodium 100 MG capsule  Commonly known as:  COLACE   100 mg, Oral, 2 Times Daily, WHILE TAKING NARCOTICS      HYDROcodone-acetaminophen 7.5-325 MG per tablet  Commonly known as:  NORCO   1 tablet, Oral, Every 4 Hours PRN, Script provided by surgery         Continue These Medications      Instructions Start Date   LORYNA PO   Oral, Daily      VITAMIN B-12 PO   2 capsules, Oral, Daily               Discharge Disposition:  Home or Self Care    Discharge Diet:  Diet Instructions     Diet: Regular; Thin       Discharge Diet:  Regular    Fluid Consistency:  Thin          Discharge Activity:   Activity Instructions     Activity as Tolerated       PER DR MALDONADO'S RECOMMENDATIONS          Code Status/Level of Support:  Code Status and Medical Interventions:   Ordered at: 10/07/18 0238     Level Of Support Discussed With:    Patient     Code Status:    CPR     Medical Interventions (Level of Support Prior to Arrest):    Full       Future Appointments  Date Time Provider Department Center   10/18/2018 2:30 PM Gayla He APRN [facility]  [facility] None       Additional Instructions for the Follow-ups that You Need to Schedule     Discharge Follow-up with PCP    As directed      Currently Documented PCP:  Provider, No Known  PCP Phone Number:  [phone]    Follow Up Details:  1 WEEK         Discharge Follow-up with Specified Provider: JOEL; 2 Weeks    As directed      To:  JOEL    Follow Up:  2 Weeks         Discharge Follow-up with Specified Provider: RORY; 2 Weeks    As directed      To:  RORY    Follow Up:  2 Weeks    Follow Up Details:  GI WILL CALL FOR EGD               Time Spent on Discharge:  40 minutes    Electronically signed by JOE Blevins, 10/14/18, 1:19 PM.

## 2018-10-15 ENCOUNTER — TRANSITIONAL CARE MANAGEMENT TELEPHONE ENCOUNTER (OUTPATIENT)
Dept: INTERNAL MEDICINE | Facility: CLINIC | Age: 23
End: 2018-10-15

## 2018-10-15 ENCOUNTER — READMISSION MANAGEMENT (OUTPATIENT)
Dept: CALL CENTER | Facility: HOSPITAL | Age: 23
End: 2018-10-15

## 2018-10-15 LAB
CYTO UR: NORMAL
LAB AP CASE REPORT: NORMAL
LAB AP CLINICAL INFORMATION: NORMAL
PATH REPORT.FINAL DX SPEC: NORMAL
PATH REPORT.GROSS SPEC: NORMAL

## 2018-10-15 NOTE — OUTREACH NOTE
LIYAH call completed. Please see flow sheet for additional details.  Doing well. Hydrocodone controlling pain. Eating/drinking well. Denies fever/chills/n/v/d. Very mild constipation, but is taking colace QD as directed, hydrating, ambulating.  Reminded to cont stool softener while on Norco. Confirms PCP LIYAH 10/18/18 & appt w/ Dr Calderón/gen surgery.  Awaits call from GI to schedule f/up - she will call them if they don't call in a day or two.

## 2018-10-16 NOTE — OUTREACH NOTE
Prep Survey      Responses   Facility patient discharged from?  [facility]   Is patient eligible?  Yes   Discharge diagnosis  ERCP for choledocholithiasis, jaundice   Does the patient have one of the following disease processes/diagnoses(primary or secondary)?  Other   Does the patient have Home health ordered?  No   Is there a DME ordered?  No   Prep survey completed?  Yes          Anamaria Gonzalez RN

## 2018-10-18 ENCOUNTER — OFFICE VISIT (OUTPATIENT)
Dept: INTERNAL MEDICINE | Facility: CLINIC | Age: 23
End: 2018-10-18

## 2018-10-18 VITALS
WEIGHT: 218 LBS | HEART RATE: 84 BPM | BODY MASS INDEX: 34.21 KG/M2 | HEIGHT: 67 IN | SYSTOLIC BLOOD PRESSURE: 122 MMHG | OXYGEN SATURATION: 99 % | DIASTOLIC BLOOD PRESSURE: 82 MMHG

## 2018-10-18 DIAGNOSIS — K81.9 CHOLECYSTITIS: Primary | ICD-10-CM

## 2018-10-18 PROCEDURE — 99495 TRANSJ CARE MGMT MOD F2F 14D: CPT | Performed by: NURSE PRACTITIONER

## 2018-10-18 NOTE — PROGRESS NOTES
"Subjective   Piyush Wren is a 23 y.o. female.   Chief Complaint   Patient presents with   • Transitional Care Management      History of Present Illness Had GB removed last Friday.  Is feeling much better.  Patient denies fever chills, headache, ear pain, sore throat, shortness of air, cough, chest pain,.  Has abdominal pain that is improving daily.  No nausea, vomiting, diarrhea, dysuria, blood in stool or urine.  Mood is good.  Eating and drinking as usual.  No unexplained weight loss or gain.      The following portions of the patient's history were reviewed and updated as appropriate: allergies, current medications, past family history, past medical history, past social history, past surgical history and problem list.    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  No tobacco.  Rare ETOH.      Current Outpatient Prescriptions:   •  Cyanocobalamin (VITAMIN B-12 PO), Take 2 capsules by mouth Daily., Disp: , Rfl:   •  docusate sodium (COLACE) 100 MG capsule, Take 1 capsule by mouth 2 (Two) Times a Day. WHILE TAKING NARCOTICS, Disp: 20 capsule, Rfl: 0  •  Drospirenone-Ethinyl Estradiol (LORYNA PO), Take  by mouth Daily., Disp: , Rfl:   •  HYDROcodone-acetaminophen (NORCO) 5-325 MG per tablet, Take 1 tablet by mouth Every 6 (Six) Hours As Needed for Pain, Disp: 15 tablet, Rfl: 0    Review of Systems Consitutional, HEENT, Respiratory, CV, GI, , Skin, Musculoskeletal, Neuro-mental, Endocrinological, Hematological were reviewed.  Positives were discussed in the HPI, otherwise ROS was negative   /82   Pulse 84   Ht 170.2 cm (67\")   Wt 98.9 kg (218 lb)   LMP 10/09/2018 Comment: neg hcg 10/6/2018  SpO2 99%   Breastfeeding? No   BMI 34.14 kg/m²     Objective   Allergies   Allergen Reactions   • Penicillins Rash       Physical Exam   Constitutional: She is oriented to person, place, and time. She appears well-developed and well-nourished. No distress.   HENT:   Head: Normocephalic.   Eyes: No scleral " icterus.   Neck: Neck supple.   Cardiovascular: Normal rate, regular rhythm, normal heart sounds and intact distal pulses.  Exam reveals no gallop and no friction rub.    No murmur heard.  Pulmonary/Chest: Effort normal and breath sounds normal. No respiratory distress. She has no wheezes. She has no rales.   Abdominal: Soft. Bowel sounds are normal.   States abdomen is sore to palpation.  It has improved daily.   Lymphadenopathy:     She has no cervical adenopathy.   Neurological: She is alert and oriented to person, place, and time.   Skin: Skin is warm and dry. Capillary refill takes less than 2 seconds.   Is pink, no rash    Psychiatric: She has a normal mood and affect. Her behavior is normal. Judgment and thought content normal.   Nursing note and vitals reviewed.      Procedures        Assessment/Plan   Piyush was seen today for transitional care management.    Diagnoses and all orders for this visit:    Cholecystitis        Patient Instructions   Patient reports she is feeling well.  She is eating and drinking and denies any complaints today.  We will see as needed.        JOE Padilla

## 2018-10-18 NOTE — PATIENT INSTRUCTIONS
Patient reports she is feeling well.  She is eating and drinking and denies any complaints today.  We will see as needed.

## 2018-10-22 ENCOUNTER — READMISSION MANAGEMENT (OUTPATIENT)
Dept: CALL CENTER | Facility: HOSPITAL | Age: 23
End: 2018-10-22

## 2018-10-22 NOTE — OUTREACH NOTE
Medical Week 1 Survey      Responses   Facility patient discharged from?  [facility]   Does the patient have one of the following disease processes/diagnoses(primary or secondary)?  Other   Is there a successful TCM telephone encounter documented?  Yes          Kvng Lindo RN

## 2018-10-23 ENCOUNTER — READMISSION MANAGEMENT (OUTPATIENT)
Dept: CALL CENTER | Facility: HOSPITAL | Age: 23
End: 2018-10-23

## 2018-10-23 NOTE — OUTREACH NOTE
Medical Week 2 Survey      Responses   Facility patient discharged from?  [facility]   Does the patient have one of the following disease processes/diagnoses(primary or secondary)?  Other   Week 2 attempt successful?  No   Unsuccessful attempts  Attempt 1          Latanya Salas RN

## 2018-10-24 ENCOUNTER — READMISSION MANAGEMENT (OUTPATIENT)
Dept: CALL CENTER | Facility: HOSPITAL | Age: 23
End: 2018-10-24

## 2018-10-24 NOTE — OUTREACH NOTE
Medical Week 2 Survey      Responses   Facility patient discharged from?  [facility]   Does the patient have one of the following disease processes/diagnoses(primary or secondary)?  Other   Week 2 attempt successful?  Yes   Call start time  1243   General alerts for this patient  Relative Roberta Vega is a doctor.   Discharge diagnosis  ERCP for choledocholithiasis, jaundice   Call end time  1244   Is patient permission given to speak with other caregiver?  Yes   Person spoke with today (if not patient) and relationship  Gary Granados MD Relative   [phone]    Meds reviewed with patient/caregiver?  Yes   Is the patient having any side effects they believe may be caused by any medication additions or changes?  No   Does the patient have all medications ordered at discharge?  Yes   Is the patient taking all medications as directed (includes completed medication regime)?  Yes   Does the patient have a primary care provider?   Yes   Does the patient have an appointment with their PCP within 7 days of discharge?  Yes   Has the patient kept scheduled appointments due by today?  Yes   Comments  Seen PCP. Will see GI with ERCP.   Has home health visited the patient within 72 hours of discharge?  N/A   Psychosocial issues?  No   Did the patient receive a copy of their discharge instructions?  Yes   Nursing interventions  Reviewed instructions with patient   What is the patient's perception of their health status since discharge?  Improving   Is the patient/caregiver able to teach back signs and symptoms related to disease process for when to call PCP?  Yes   Is the patient/caregiver able to teach back signs and symptoms related to disease process for when to call 911?  Yes   Is the patient/caregiver able to teach back the hierarchy of who to call/visit for symptoms/problems? PCP, Specialist, Home health nurse, Urgent Care, ED, 911  Yes   Graduated  Yes   Graduated/Revoked comments  No issues. She has seen PCP and will see GI  for another ERCP. Gary Granados MD Relative states that she can get in touch with him PRN with issues.          Noah Wahl RN

## 2018-10-26 ENCOUNTER — PREP FOR SURGERY (OUTPATIENT)
Dept: OTHER | Facility: HOSPITAL | Age: 23
End: 2018-10-26

## 2018-10-26 DIAGNOSIS — K80.50 CHOLEDOCHOLITHIASIS: Primary | ICD-10-CM

## 2018-11-08 ENCOUNTER — ANESTHESIA (OUTPATIENT)
Dept: GASTROENTEROLOGY | Facility: HOSPITAL | Age: 23
End: 2018-11-08

## 2018-11-08 ENCOUNTER — ANESTHESIA EVENT (OUTPATIENT)
Dept: GASTROENTEROLOGY | Facility: HOSPITAL | Age: 23
End: 2018-11-08

## 2018-11-08 ENCOUNTER — HOSPITAL ENCOUNTER (OUTPATIENT)
Facility: HOSPITAL | Age: 23
Setting detail: HOSPITAL OUTPATIENT SURGERY
Discharge: HOME | End: 2018-11-08
Attending: INTERNAL MEDICINE | Admitting: INTERNAL MEDICINE

## 2018-11-08 VITALS
BODY MASS INDEX: 34.21 KG/M2 | TEMPERATURE: 98 F | WEIGHT: 218 LBS | RESPIRATION RATE: 16 BRPM | SYSTOLIC BLOOD PRESSURE: 103 MMHG | HEIGHT: 67 IN | HEART RATE: 66 BPM | OXYGEN SATURATION: 100 % | DIASTOLIC BLOOD PRESSURE: 63 MMHG

## 2018-11-08 LAB
B-HCG UR QL: NEGATIVE
INTERNAL NEGATIVE CONTROL: NORMAL
INTERNAL POSITIVE CONTROL: NORMAL
Lab: NORMAL

## 2018-11-08 PROCEDURE — 99202 OFFICE O/P NEW SF 15 MIN: CPT | Performed by: INTERNAL MEDICINE

## 2018-11-08 PROCEDURE — 81025 URINE PREGNANCY TEST: CPT | Performed by: ANESTHESIOLOGY

## 2018-11-08 PROCEDURE — 25010000002 PROPOFOL 10 MG/ML EMULSION: Performed by: NURSE ANESTHETIST, CERTIFIED REGISTERED

## 2018-11-08 RX ORDER — PROMETHAZINE HYDROCHLORIDE 25 MG/1
25 SUPPOSITORY RECTAL ONCE AS NEEDED
Status: DISCONTINUED | OUTPATIENT
Start: 2018-11-08 | End: 2018-11-08 | Stop reason: HOSPADM

## 2018-11-08 RX ORDER — IPRATROPIUM BROMIDE AND ALBUTEROL SULFATE 2.5; .5 MG/3ML; MG/3ML
3 SOLUTION RESPIRATORY (INHALATION) ONCE AS NEEDED
Status: DISCONTINUED | OUTPATIENT
Start: 2018-11-08 | End: 2018-11-08 | Stop reason: HOSPADM

## 2018-11-08 RX ORDER — LABETALOL HYDROCHLORIDE 5 MG/ML
5 INJECTION, SOLUTION INTRAVENOUS
Status: DISCONTINUED | OUTPATIENT
Start: 2018-11-08 | End: 2018-11-08 | Stop reason: HOSPADM

## 2018-11-08 RX ORDER — FAMOTIDINE 20 MG/1
20 TABLET, FILM COATED ORAL ONCE
Status: DISCONTINUED | OUTPATIENT
Start: 2018-11-08 | End: 2018-11-08 | Stop reason: HOSPADM

## 2018-11-08 RX ORDER — LIDOCAINE HYDROCHLORIDE 10 MG/ML
0.5 INJECTION, SOLUTION EPIDURAL; INFILTRATION; INTRACAUDAL; PERINEURAL ONCE AS NEEDED
Status: DISCONTINUED | OUTPATIENT
Start: 2018-11-08 | End: 2018-11-08 | Stop reason: HOSPADM

## 2018-11-08 RX ORDER — LIDOCAINE HYDROCHLORIDE 10 MG/ML
INJECTION, SOLUTION EPIDURAL; INFILTRATION; INTRACAUDAL; PERINEURAL AS NEEDED
Status: DISCONTINUED | OUTPATIENT
Start: 2018-11-08 | End: 2018-11-08 | Stop reason: SURG

## 2018-11-08 RX ORDER — PROMETHAZINE HYDROCHLORIDE 25 MG/1
25 TABLET ORAL ONCE AS NEEDED
Status: DISCONTINUED | OUTPATIENT
Start: 2018-11-08 | End: 2018-11-08 | Stop reason: HOSPADM

## 2018-11-08 RX ORDER — HYDROCODONE BITARTRATE AND ACETAMINOPHEN 5; 325 MG/1; MG/1
1 TABLET ORAL EVERY 6 HOURS PRN
COMMUNITY
End: 2021-05-20

## 2018-11-08 RX ORDER — SODIUM CHLORIDE 0.9 % (FLUSH) 0.9 %
3 SYRINGE (ML) INJECTION EVERY 12 HOURS SCHEDULED
Status: DISCONTINUED | OUTPATIENT
Start: 2018-11-08 | End: 2018-11-08 | Stop reason: HOSPADM

## 2018-11-08 RX ORDER — SODIUM CHLORIDE, SODIUM LACTATE, POTASSIUM CHLORIDE, CALCIUM CHLORIDE 600; 310; 30; 20 MG/100ML; MG/100ML; MG/100ML; MG/100ML
9 INJECTION, SOLUTION INTRAVENOUS CONTINUOUS
Status: DISCONTINUED | OUTPATIENT
Start: 2018-11-08 | End: 2018-11-08 | Stop reason: HOSPADM

## 2018-11-08 RX ORDER — PROMETHAZINE HYDROCHLORIDE 25 MG/ML
6.25 INJECTION, SOLUTION INTRAMUSCULAR; INTRAVENOUS ONCE AS NEEDED
Status: DISCONTINUED | OUTPATIENT
Start: 2018-11-08 | End: 2018-11-08 | Stop reason: HOSPADM

## 2018-11-08 RX ORDER — SODIUM CHLORIDE 0.9 % (FLUSH) 0.9 %
3-10 SYRINGE (ML) INJECTION AS NEEDED
Status: DISCONTINUED | OUTPATIENT
Start: 2018-11-08 | End: 2018-11-08 | Stop reason: HOSPADM

## 2018-11-08 RX ORDER — FAMOTIDINE 10 MG/ML
20 INJECTION, SOLUTION INTRAVENOUS ONCE
Status: COMPLETED | OUTPATIENT
Start: 2018-11-08 | End: 2018-11-08

## 2018-11-08 RX ORDER — PROPOFOL 10 MG/ML
VIAL (ML) INTRAVENOUS AS NEEDED
Status: DISCONTINUED | OUTPATIENT
Start: 2018-11-08 | End: 2018-11-08 | Stop reason: SURG

## 2018-11-08 RX ORDER — ONDANSETRON 2 MG/ML
4 INJECTION INTRAMUSCULAR; INTRAVENOUS ONCE AS NEEDED
Status: DISCONTINUED | OUTPATIENT
Start: 2018-11-08 | End: 2018-11-08 | Stop reason: HOSPADM

## 2018-11-08 RX ADMIN — PROPOFOL 50 MG: 10 INJECTION, EMULSION INTRAVENOUS at 10:41

## 2018-11-08 RX ADMIN — PROPOFOL 50 MG: 10 INJECTION, EMULSION INTRAVENOUS at 10:44

## 2018-11-08 RX ADMIN — FAMOTIDINE 20 MG: 10 INJECTION, SOLUTION INTRAVENOUS at 09:44

## 2018-11-08 RX ADMIN — LIDOCAINE HYDROCHLORIDE 100 MG: 10 INJECTION, SOLUTION EPIDURAL; INFILTRATION; INTRACAUDAL; PERINEURAL at 10:41

## 2018-11-08 RX ADMIN — PROPOFOL 50 MG: 10 INJECTION, EMULSION INTRAVENOUS at 10:46

## 2018-11-08 RX ADMIN — PROPOFOL 30 MG: 10 INJECTION, EMULSION INTRAVENOUS at 10:54

## 2018-11-08 RX ADMIN — PROPOFOL 20 MG: 10 INJECTION, EMULSION INTRAVENOUS at 10:52

## 2018-11-08 RX ADMIN — PROPOFOL 30 MG: 10 INJECTION, EMULSION INTRAVENOUS at 10:48

## 2018-11-08 RX ADMIN — PROPOFOL 30 MG: 10 INJECTION, EMULSION INTRAVENOUS at 10:45

## 2018-11-08 RX ADMIN — SODIUM CHLORIDE, POTASSIUM CHLORIDE, SODIUM LACTATE AND CALCIUM CHLORIDE 9 ML/HR: 600; 310; 30; 20 INJECTION, SOLUTION INTRAVENOUS at 09:45

## 2018-11-08 RX ADMIN — PROPOFOL 20 MG: 10 INJECTION, EMULSION INTRAVENOUS at 10:43

## 2018-11-08 RX ADMIN — PROPOFOL 30 MG: 10 INJECTION, EMULSION INTRAVENOUS at 10:50

## 2018-11-08 RX ADMIN — SODIUM CHLORIDE, POTASSIUM CHLORIDE, SODIUM LACTATE AND CALCIUM CHLORIDE: 600; 310; 30; 20 INJECTION, SOLUTION INTRAVENOUS at 10:37

## 2018-11-08 NOTE — ANESTHESIA PREPROCEDURE EVALUATION
Anesthesia Evaluation     Patient summary reviewed and Nursing notes reviewed   no history of anesthetic complications:  NPO Solid Status: > 8 hours  NPO Liquid Status: > 8 hours           Airway   Mallampati: II  TM distance: >3 FB  Neck ROM: full  No difficulty expected  Dental      Pulmonary - negative pulmonary ROS and normal exam   Cardiovascular - negative cardio ROS and normal exam        Neuro/Psych- negative ROS  GI/Hepatic/Renal/Endo - negative ROS     Musculoskeletal     Abdominal    Substance History      OB/GYN          Other                        Anesthesia Plan    ASA 2     general     intravenous induction   Anesthetic plan, all risks, benefits, and alternatives have been provided, discussed and informed consent has been obtained with: patient.    Plan discussed with CRNA.

## 2018-11-08 NOTE — BRIEF OP NOTE
ESOPHAGOGASTRODUODENOSCOPY  Progress Note    Piyush Wren  11/8/2018    Temporary biliary stent removed with snare, without difficult.    Mark I. Brunner, MD     Date: 11/8/2018  Time: 10:59 AM

## 2018-11-08 NOTE — ANESTHESIA POSTPROCEDURE EVALUATION
Patient: Piyush Wren    Procedure Summary     Date:  11/08/18 Room / [facility]:   [facility]    Anesthesia Start:  1039 Anesthesia Stop:      Procedure:  ESOPHAGOGASTRODUODENOSCOPY WITH STENT REMOVAL (N/A ) Diagnosis:       Choledocholithiasis      (Choledocholithiasis [K80.50])    Surgeon:  Brunner, Mark I, MD Provider:  Rodrigo Bartholomew MD    Anesthesia Type:  general ASA Status:  2          Anesthesia Type: general  Last vitals  BP   90/43 (11/08/18 1103)   Temp   97.6 °F (36.4 °C) (11/08/18 1103)   Pulse   74 (11/08/18 1103)   Resp   10 (11/08/18 0934)     SpO2   100 % (11/08/18 1103)     Post Anesthesia Care and Evaluation    Patient location during evaluation: PACU  Patient participation: complete - patient participated  Level of consciousness: awake and alert  Pain score: 0  Pain management: adequate  Airway patency: patent  Anesthetic complications: No anesthetic complications  PONV Status: none  Cardiovascular status: hemodynamically stable and acceptable  Respiratory status: nonlabored ventilation and acceptable  Hydration status: acceptable

## 2018-11-08 NOTE — CONSULTS
[facility] [place] [facility] [place]    Referring Provider: Brunner, Mark I, MD    PCP: Provider, No Known    Reason for Consultation: CBD stent    Chief complaint: Removal of CBD stent    History of present illness:    Piyush Wren is a 23 y.o. female who presents for removal of temporary CBD stent placed for obstructing CBD stone at time of ERCP on 10/7. Subsequently had the large stone removed via choledochotomy at time of lap maddy. Currently denies abdominal pain, nausea, or fever.    Allergies:  Penicillins    Scheduled Meds:    famotidine 20 mg Oral Once   sodium chloride 3 mL Intravenous Q12H        Infusions:    lactated ringers 9 mL/hr Last Rate: 9 mL/hr (11/08/18 0971)       PRN Meds:  lidocaine PF 1%  •  sodium chloride    Home Meds:  Prescriptions Prior to Admission   Medication Sig Dispense Refill Last Dose   • Cyanocobalamin (VITAMIN B-12 PO) Take 2 capsules by mouth Daily.   11/7/2018 at 2200   • Drospirenone-Ethinyl Estradiol (LORYNA PO) Take  by mouth Daily.   11/7/2018 at 2200   • HYDROcodone-acetaminophen (NORCO) 5-325 MG per tablet Take 1 tablet by mouth Every 6 (Six) Hours As Needed.   11/4/2018 at Unknown time   • docusate sodium (COLACE) 100 MG capsule Take 1 capsule by mouth 2 (Two) Times a Day. WHILE TAKING NARCOTICS 20 capsule 0 More than a month at Unknown time       ROS: Review of Systems   Constitutional: Negative.    HENT: Negative.    Eyes: Negative.    Respiratory: Negative.    Cardiovascular: Negative.    Gastrointestinal: Negative.  Negative for abdominal distention, abdominal pain, blood in stool, constipation, diarrhea, nausea and vomiting.   Endocrine: Negative.    Musculoskeletal: Negative.    Skin: Negative.    Allergic/Immunologic: Negative.    Neurological: Negative.    Hematological: Negative.    Psychiatric/Behavioral: Negative.        PAST MED HX:  History reviewed. No pertinent past medical history.    PAST SURG HX:  Past Surgical History:   Procedure Laterality Date   •  "CHOLECYSTECTOMY N/A 10/12/2018    Procedure: CHOLECYSTECTOMY LAPAROSCOPIC WITH IOC;  Surgeon: Surendra Calderón MD;  Location:  [facility];  Service: General   • COMMON BILE DUCT EXPLORATION N/A 10/12/2018    Procedure: COMMON BILE DUCT EXPLORATION;  Surgeon: Surendra Calderón MD;  Location:  [facility];  Service: General   • ERCP N/A 10/7/2018    Procedure: ENDOSCOPIC RETROGRADE CHOLANGIOPANCREATOGRAPHY WITH DEPLOYMENT OF BILLIARY STENT;  Surgeon: Brunner, Mark I, MD;  Location:  [facility];  Service: Gastroenterology   • PILONIDAL CYST DRAINAGE  2013       FAM HX:  Family History   Problem Relation Age of Onset   • COPD Mother    • Hyperlipidemia Father    • Heart disease Father        SOC HX:  Social History     Social History   • Marital status: Single     Spouse name: N/A   • Number of children: N/A   • Years of education: N/A     Occupational History   • Not on file.     Social History Main Topics   • Smoking status: Never Smoker   • Smokeless tobacco: Never Used   • Alcohol use No   • Drug use: No   • Sexual activity: Defer     Other Topics Concern   • Not on file     Social History Narrative   • No narrative on file       PHYSICAL EXAM  /70 (BP Location: Left arm, Patient Position: Lying)   Pulse 82   Temp 97.5 °F (36.4 °C) (Tympanic)   Resp 10   Ht 170.2 cm (67\")   Wt 98.9 kg (218 lb)   LMP 10/09/2018 Comment: neg hcg 10/6/2018  SpO2 100%   BMI 34.14 kg/m²   Wt Readings from Last 3 Encounters:   11/08/18 98.9 kg (218 lb)   10/18/18 98.9 kg (218 lb)   10/07/18 103 kg (226 lb 1.6 oz)   ,body mass index is 34.14 kg/m².  Physical Exam   Constitutional: She appears well-developed and well-nourished. No distress.   HENT:   Head: Normocephalic.   Mouth/Throat: No oropharyngeal exudate.   Eyes: Conjunctivae are normal. No scleral icterus.   Neck: Normal range of motion.   Cardiovascular: Normal rate and regular rhythm.    No murmur heard.  Pulmonary/Chest: Effort normal and breath " sounds normal. No respiratory distress. She has no wheezes. She has no rales.   Abdominal: Soft. Bowel sounds are normal. She exhibits no distension and no mass. There is no tenderness. There is no guarding.   obese   Genitourinary:   Genitourinary Comments: Deferred   Musculoskeletal: Normal range of motion. She exhibits no edema.   Neurological: She is alert.   Skin: Skin is warm and dry. Capillary refill takes less than 2 seconds. No rash noted.   Psychiatric: She has a normal mood and affect. Her behavior is normal.   Nursing note and vitals reviewed.    Results Review:   I reviewed the patient's new clinical results.    Lab Results   Component Value Date    WBC 12.36 (H) 10/14/2018    HGB 10.4 (L) 10/14/2018    HGB 11.1 (L) 10/11/2018    HGB 10.8 (L) 10/10/2018    HCT 33.1 (L) 10/14/2018    MCV 85.3 10/14/2018     10/14/2018       No results found for: INR    Lab Results   Component Value Date    GLUCOSE 80 10/14/2018    BUN <5 (L) 10/14/2018    CREATININE 0.62 10/14/2018    EGFRIFNONA 120 10/14/2018    BCR  10/14/2018      Comment:      Unable to calculate Bun/Crea Ratio.    CO2 28.0 10/14/2018    CALCIUM 8.9 10/14/2018    ALBUMIN 3.30 10/14/2018    ALKPHOS 96 10/14/2018    BILITOT 0.7 10/14/2018    BILIDIR 0.8 (H) 10/10/2018    ALT 92 (H) 10/14/2018    AST 28 10/14/2018       ASSESSMENTS/PLANS    Choledocholithiasis, s/p removal by choledochotomy.  Temporary CBD stent.    >> EGD with stent removal.    I discussed the patients findings and my recommendations with patient and family    Mark I. Brunner, MD  11/08/18  10:03 AM

## 2021-05-20 ENCOUNTER — APPOINTMENT (OUTPATIENT)
Dept: CT IMAGING | Facility: HOSPITAL | Age: 26
End: 2021-05-20

## 2021-05-20 ENCOUNTER — HOSPITAL ENCOUNTER (EMERGENCY)
Facility: HOSPITAL | Age: 26
Discharge: HOME | End: 2021-05-20
Attending: EMERGENCY MEDICINE | Admitting: EMERGENCY MEDICINE

## 2021-05-20 VITALS
HEART RATE: 94 BPM | BODY MASS INDEX: 38.45 KG/M2 | DIASTOLIC BLOOD PRESSURE: 95 MMHG | SYSTOLIC BLOOD PRESSURE: 116 MMHG | TEMPERATURE: 98.7 F | HEIGHT: 67 IN | WEIGHT: 245 LBS | OXYGEN SATURATION: 99 % | RESPIRATION RATE: 16 BRPM

## 2021-05-20 DIAGNOSIS — S00.03XA CONTUSION OF SCALP, INITIAL ENCOUNTER: Primary | ICD-10-CM

## 2021-05-20 PROCEDURE — 70450 CT HEAD/BRAIN W/O DYE: CPT

## 2021-05-20 PROCEDURE — 99283 EMERGENCY DEPT VISIT LOW MDM: CPT

## 2021-07-16 RX ORDER — ETHINYL ESTRADIOL/DROSPIRENONE 0.02-3(28)
TABLET ORAL
Qty: 28 TABLET | Refills: 0 | Status: SHIPPED | OUTPATIENT
Start: 2021-07-16 | End: 2021-07-30 | Stop reason: SDUPTHER

## 2021-07-30 ENCOUNTER — OFFICE VISIT (OUTPATIENT)
Dept: OBSTETRICS AND GYNECOLOGY | Facility: CLINIC | Age: 26
End: 2021-07-30

## 2021-07-30 VITALS
SYSTOLIC BLOOD PRESSURE: 120 MMHG | WEIGHT: 250 LBS | DIASTOLIC BLOOD PRESSURE: 70 MMHG | HEIGHT: 67 IN | BODY MASS INDEX: 39.24 KG/M2

## 2021-07-30 DIAGNOSIS — E66.9 OBESITY (BMI 30-39.9): ICD-10-CM

## 2021-07-30 DIAGNOSIS — Z01.419 ROUTINE GYNECOLOGICAL EXAMINATION: Primary | ICD-10-CM

## 2021-07-30 LAB
ALBUMIN SERPL-MCNC: 3.9 G/DL (ref 3.5–5.2)
ALBUMIN/GLOB SERPL: 1.3 G/DL
ALP SERPL-CCNC: 77 U/L (ref 39–117)
ALT SERPL-CCNC: 14 U/L (ref 1–33)
AST SERPL-CCNC: 16 U/L (ref 1–32)
BILIRUB SERPL-MCNC: 0.3 MG/DL (ref 0–1.2)
BUN SERPL-MCNC: 6 MG/DL (ref 6–20)
BUN/CREAT SERPL: 9.5 (ref 7–25)
CALCIUM SERPL-MCNC: 9.5 MG/DL (ref 8.6–10.5)
CHLORIDE SERPL-SCNC: 107 MMOL/L (ref 98–107)
CHOLEST SERPL-MCNC: 182 MG/DL (ref 0–200)
CO2 SERPL-SCNC: 23.6 MMOL/L (ref 22–29)
CREAT SERPL-MCNC: 0.63 MG/DL (ref 0.57–1)
ERYTHROCYTE [DISTWIDTH] IN BLOOD BY AUTOMATED COUNT: 12.2 % (ref 12.3–15.4)
GLOBULIN SER CALC-MCNC: 2.9 GM/DL
GLUCOSE SERPL-MCNC: 78 MG/DL (ref 65–99)
HBA1C MFR BLD: 5 % (ref 4.8–5.6)
HCT VFR BLD AUTO: 38.2 % (ref 34–46.6)
HDLC SERPL-MCNC: 64 MG/DL (ref 40–60)
HGB BLD-MCNC: 12.7 G/DL (ref 12–15.9)
LDLC SERPL CALC-MCNC: 101 MG/DL (ref 0–100)
MCH RBC QN AUTO: 28.1 PG (ref 26.6–33)
MCHC RBC AUTO-ENTMCNC: 33.2 G/DL (ref 31.5–35.7)
MCV RBC AUTO: 84.5 FL (ref 79–97)
PLATELET # BLD AUTO: 346 10*3/MM3 (ref 140–450)
POTASSIUM SERPL-SCNC: 4.1 MMOL/L (ref 3.5–5.2)
PROT SERPL-MCNC: 6.8 G/DL (ref 6–8.5)
RBC # BLD AUTO: 4.52 10*6/MM3 (ref 3.77–5.28)
SODIUM SERPL-SCNC: 142 MMOL/L (ref 136–145)
TRIGL SERPL-MCNC: 94 MG/DL (ref 0–150)
VLDLC SERPL CALC-MCNC: 17 MG/DL (ref 5–40)
WBC # BLD AUTO: 8.95 10*3/MM3 (ref 3.4–10.8)

## 2021-07-30 PROCEDURE — 99395 PREV VISIT EST AGE 18-39: CPT | Performed by: OBSTETRICS & GYNECOLOGY

## 2021-07-30 RX ORDER — DROSPIRENONE AND ETHINYL ESTRADIOL 0.02-3(28)
1 KIT ORAL DAILY
Qty: 84 TABLET | Refills: 4 | Status: SHIPPED | OUTPATIENT
Start: 2021-07-30 | End: 2022-09-27

## 2021-07-30 NOTE — PROGRESS NOTES
GYN Annual Exam     CC - Here for annual exam. Patient is an established patient.    Subjective   HPI  Piyush Wren is a 25 y.o. female, No obstetric history on file., who presents for annual well woman exam.   Patient's last menstrual period was 07/24/2021 (exact date)..  Periods are regular every 25-35 days, lasting 7 days.  Dysmenorrhea:mild, occurring premenstrually and first 1-2 days of flow.  Patient reports problems with: none.    Partner Status: Marital Status: single.  New Partners since last visit: no.  Desires STD Screening: no.  HPV vaccinated? : yes    Additional OB/GYN History   Current contraception: contraceptive methods: OCP (estrogen/progesterone)  Desires to: continue contraception    Last Pap : 6/24/2019 negative  Last Completed Pap Smear     This patient has no relevant Health Maintenance data.        History of abnormal Pap smear: no  Family history of uterine, colon, breast, or ovarian cancer: yes - PGM breast ca  Performs monthly Self-Breast Exam: yes  Exercises Regularly:yes  Feelings of Anxiety or Depression: no  Tobacco Usage?: No   OB History    No obstetric history on file.         Health Maintenance   Topic Date Due   • Annual Gynecologic Pelvic and Breast Exam  Never done   • ANNUAL PHYSICAL  Never done   • HPV VACCINES (1 - 2-dose series) Never done   • TDAP/TD VACCINES (1 - Tdap) Never done   • HEPATITIS C SCREENING  Never done   • PAP SMEAR  Never done   • INFLUENZA VACCINE  10/01/2021   • COVID-19 Vaccine  Completed   • Pneumococcal Vaccine 0-64  Aged Out       The additional following portions of the patient's history were reviewed and updated as appropriate: allergies, current medications, past family history, past medical history, past social history, past surgical history and problem list.    Review of Systems   All other systems reviewed and are negative.      I have reviewed and agree with the HPI, ROS, and historical information as entered above. Sona De Los Santos,  "MD    Objective   /70   Ht 170.2 cm (67\")   Wt 113 kg (250 lb)   LMP 07/24/2021 (Exact Date)   Breastfeeding No   BMI 39.16 kg/m²     Physical Exam    General:  well developed; obese, no acute distress  Skin:  No suspicious lesions seen  Thyroid: normal to inspection and palpation  Breasts:  Examined in supine position  Symmetric without masses or skin dimpling  Nipples normal without inversion, lesions or discharge  There are no palpable axillary nodes  CVS: RRR, no M/R/G, distal pulses wnl  Resp: CTAB, No W/R/R  Abdomen: soft, non-tender; no masses  no umbilical or inguinal hernias are present  no hepato-splenomegaly  Pelvis: Clinical staff was present for exam  External genitalia:  normal appearance of the external genitalia including Bartholin's and [place]'s glands.  Urethra: no masses or tenderness  Urethral meatus: normal size;  No lesions or signs of prolapse  Bladder: non tender to palpation, no masses, no prolapse  Vagina:  normal pink mucosa without prolapse or lesions.  Cervix:  normal appearance.  Uterus:  normal size, shape and consistency.  Adnexa:  normal bimanual exam of the adnexa.  Perineum/Anus: normal appearance, no external hemorrhoids  Ext: 2+ pulses, no edema    Assessment/Plan     Assessment     Problem List Items Addressed This Visit     Routine gynecological examination - Primary    Relevant Orders    Chlamydia trachomatis, Neisseria gonorrhoeae, PCR w/ confirmation - Swab, Urinary Bladder    Comprehensive Metabolic Panel    CBC (No Diff)    Lipid Panel    Hemoglobin A1c    Obesity (BMI 30-39.9)            Plan     1. Reviewed Pap screening guidelines  2. Pap not indicated today  3. G/C alone done  4. Encouraged use of condoms for STD prevention.  5. OCP's/Patch/Vaginal Ring - Discussed side effects of nausea, BTB, headaches, breast tenderness and slight weight gain in the first three cycles.  Understands risks of blood clots, stroke, and theoretical risk of breast cancer.  Denies " family history of blood clots.  6. Reviewed monthly self breast exams.  Instructed to call with lumps, pain, or breast discharge.    7. Reviewed BMI and weight loss as preventative health measures.   8. Reviewed exercise and healthy diet as a preventative health measures.   9. Reviewed/encouraged HPV Vaccination  10. RTC in 1 year or PRN with problems      Sona De Los Santos MD  07/30/2021

## 2021-08-01 LAB
C TRACH RRNA SPEC QL NAA+PROBE: NEGATIVE
N GONORRHOEA RRNA SPEC QL NAA+PROBE: NEGATIVE

## 2022-09-12 ENCOUNTER — TELEPHONE (OUTPATIENT)
Dept: OBSTETRICS AND GYNECOLOGY | Facility: CLINIC | Age: 27
End: 2022-09-12

## 2022-09-12 NOTE — TELEPHONE ENCOUNTER
Returned pt's call. States she needs to schedule her annual appt, but is not out of BC pills at this time. Schedule pt with LIYAH 9/27 at 0850 at [place] office per pt request.

## 2022-09-27 ENCOUNTER — OFFICE VISIT (OUTPATIENT)
Dept: OBSTETRICS AND GYNECOLOGY | Facility: CLINIC | Age: 27
End: 2022-09-27

## 2022-09-27 VITALS
DIASTOLIC BLOOD PRESSURE: 76 MMHG | SYSTOLIC BLOOD PRESSURE: 132 MMHG | HEIGHT: 68 IN | WEIGHT: 278.2 LBS | BODY MASS INDEX: 42.16 KG/M2

## 2022-09-27 DIAGNOSIS — Z01.419 ROUTINE GYNECOLOGICAL EXAMINATION: ICD-10-CM

## 2022-09-27 DIAGNOSIS — F41.9 ANXIETY: ICD-10-CM

## 2022-09-27 DIAGNOSIS — Z11.3 SCREENING FOR STDS (SEXUALLY TRANSMITTED DISEASES): Primary | ICD-10-CM

## 2022-09-27 PROCEDURE — 99395 PREV VISIT EST AGE 18-39: CPT | Performed by: OBSTETRICS & GYNECOLOGY

## 2022-09-27 RX ORDER — DROSPIRENONE AND ETHINYL ESTRADIOL 0.03MG-3MG
1 KIT ORAL DAILY
Qty: 90 TABLET | Refills: 4 | Status: SHIPPED | OUTPATIENT
Start: 2022-09-27 | End: 2023-09-27

## 2022-09-27 NOTE — PROGRESS NOTES
Gynecologic Annual Exam Note        Annual        Subjective     HPI  Piyush Wren is a 26 y.o.  female who presents for annual well woman exam as a established patient. There were no changes to her medical or surgical history since her last visit.. Patient reports problems with: none.  She would like to discuss the potential of her having PCOS.  Patient's last menstrual period was 09/15/2022 (exact date).. Her periods are regular every 25-35 days, lasting 6-7 days. The flow is light to moderate.     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    Dysmenorrhea:moderate to severe, occurring first 1-2 days of flow. .   Partner Status: Marital Status: single.  She is not currently sexually active, and denies any new sexual partner since her last visit. STD testing recommendations have been explained to the patient and she does desire STD testing.    Additional OB/GYN History   Current contraception: contraceptive methods: OCP (estrogen/progesterone)  Desires to: discuss other options contraception  Thromboembolic Disease: none    History of STD: no    Last Pap : 2019. Results: negative.   Last Completed Pap Smear     This patient has no relevant Health Maintenance data.           History of abnormal Pap smear: no  Gardasil status:completed  Family history of uterine, colon, breast, or ovarian cancer: yes - PGM breast ca  Performs monthly Self-Breast Exam: yes  Exercises Regularly:no  Feelings of Anxiety or Depression: yes - anxiety  Tobacco Usage?: No       Current Outpatient Medications:   •  Cyanocobalamin (VITAMIN B-12 PO), Take 2 capsules by mouth Daily., Disp: , Rfl:   •  drospirenone-ethinyl estradiol (Sonia 28) 3-0.03 MG per tablet, Take 1 tablet by mouth Daily., Disp: 90 tablet, Rfl: 4     Patient is requesting refills of ocp.    OB History        0    Para   0    Term   0       0    AB   0    Living   0       SAB   0    IAB   0    Ectopic   0    Molar    0    Multiple   0    Live Births   0                Health Maintenance   Topic Date Due   • ANNUAL PHYSICAL  Never done   • HPV VACCINES (1 - 2-dose series) Never done   • TDAP/TD VACCINES (1 - Tdap) Never done   • HEPATITIS C SCREENING  Never done   • PAP SMEAR  Never done   • COVID-19 Vaccine (3 - Booster for Pfizer series) 05/27/2021   • INFLUENZA VACCINE  10/01/2022   • Annual Gynecologic Pelvic and Breast Exam  09/28/2023   • Pneumococcal Vaccine 0-64  Aged Out       History reviewed. No pertinent past medical history.     Past Surgical History:   Procedure Laterality Date   • CHOLECYSTECTOMY N/A 10/12/2018    Procedure: CHOLECYSTECTOMY LAPAROSCOPIC WITH IOC;  Surgeon: Surendra Calderón MD;  Location:  [facility];  Service: General   • COMMON BILE DUCT EXPLORATION N/A 10/12/2018    Procedure: COMMON BILE DUCT EXPLORATION;  Surgeon: Surendra Calderón MD;  Location:  [facility];  Service: General   • ENDOSCOPY N/A 11/8/2018    Procedure: ESOPHAGOGASTRODUODENOSCOPY WITH STENT REMOVAL;  Surgeon: Brunner, Mark I, MD;  Location:  [facility];  Service: Gastroenterology   • ERCP N/A 10/7/2018    Procedure: ENDOSCOPIC RETROGRADE CHOLANGIOPANCREATOGRAPHY WITH DEPLOYMENT OF BILLIARY STENT;  Surgeon: Brunner, Mark I, MD;  Location:  [facility];  Service: Gastroenterology   • PILONIDAL CYST DRAINAGE  2013       The additional following portions of the patient's history were reviewed and updated as appropriate: allergies, current medications, past family history, past medical history, past social history, past surgical history and problem list.    Review of Systems   Constitutional: Negative.    HENT: Negative.    Eyes: Negative.    Respiratory: Negative.    Cardiovascular: Negative.    Gastrointestinal: Negative.    Endocrine: Negative.    Genitourinary: Negative.    Musculoskeletal: Negative.    Skin: Negative.    Allergic/Immunologic: Negative.    Neurological: Negative.    Hematological: Negative.   "  Psychiatric/Behavioral: The patient is nervous/anxious.          I have reviewed and agree with the HPI, ROS, and historical information as entered above. Sona De Los Santos MD        Objective   /76   Ht 172.1 cm (67.75\")   Wt 126 kg (278 lb 3.2 oz)   LMP 09/15/2022 (Exact Date)   Breastfeeding No   BMI 42.61 kg/m²     Physical Exam    General:  well developed; obese, BMI 43  no acute distress  Skin:  No suspicious lesions seen  Thyroid: normal to inspection and palpation  Breasts:  Examined in supine position  Symmetric without masses or skin dimpling  Nipples normal without inversion, lesions or discharge  There are no palpable axillary nodes  CVS: RRR, no M/R/G, distal pulses wnl  Resp: CTAB, No W/R/R  Abdomen: soft, non-tender; no masses  no umbilical or inguinal hernias are present  no hepato-splenomegaly  Pelvis: Clinical staff was present for exam  External genitalia:  normal appearance of the external genitalia including Bartholin's and Huxley's glands.  Urethra: no masses or tenderness  Urethral meatus: normal size;  No lesions or signs of prolapse  Bladder: non tender to palpation, no masses, no prolapse  Vagina:  normal pink mucosa without prolapse or lesions.  Cervix:  normal appearance.  Uterus:  normal size, shape and consistency.  Adnexa:  normal bimanual exam of the adnexa.  Perineum/Anus: normal appearance, no external hemorrhoids  Ext: 2+ pulses, no edema       Assessment and Plan    Problem List Items Addressed This Visit     Routine gynecological examination    Relevant Orders    LIQUID-BASED PAP SMEAR, [facility] ([facility])    Anxiety    Relevant Orders    Ambulatory Referral to Behavioral Health    Screening for STDs (sexually transmitted diseases) - Primary    Relevant Orders    Chlamydia trachomatis, Neisseria gonorrhoeae, PCR w/ confirmation - Swab, Cervix    DHEA-Sulfate    Insulin, Random    CBC (No Diff)    17-Hydroxyprogesterone    TSH    Testosterone Free Direct    Lipid " Panel          1. GYN annual well woman exam.   2. Reviewed pap guidelines. Pap with HPV.  3. Encouraged use of condoms for STD prevention.  4. OCP's/Vaginal Ring - Discussed side effects of nausea, BTB, headaches, breast tenderness and slight weight gain in the first three cycles.  Understands risks of blood clots, stroke, and theoretical risk of breast cancer.  Denies family history of blood clots.  Will increase to 30mcg pill for BTB.  If not resolved with that in several cycles, she will f/u.  5. Reviewed monthly self breast exams.  Instructed to call with lumps, pain, or breast discharge.    6. Reviewed BMI and weight loss as preventative health measures.   Return in about 1 year (around 9/27/2023) for Annual physical.      Sona De Los Santos MD  09/27/2022

## 2022-09-29 LAB — REF LAB TEST METHOD: NORMAL

## 2022-10-02 LAB
17OHP SERPL-MCNC: 12 NG/DL
CHOLEST SERPL-MCNC: 170 MG/DL (ref 100–199)
DHEA-S SERPL-MCNC: 293 UG/DL (ref 84.8–378)
ERYTHROCYTE [DISTWIDTH] IN BLOOD BY AUTOMATED COUNT: 12.7 % (ref 11.7–15.4)
HCT VFR BLD AUTO: 39.1 % (ref 34–46.6)
HDLC SERPL-MCNC: 65 MG/DL
HGB BLD-MCNC: 12.2 G/DL (ref 11.1–15.9)
INSULIN SERPL-ACNC: 15 UIU/ML
LDLC SERPL CALC-MCNC: 87 MG/DL (ref 0–99)
MCH RBC QN AUTO: 26.8 PG (ref 26.6–33)
MCHC RBC AUTO-ENTMCNC: 31.2 G/DL (ref 31.5–35.7)
MCV RBC AUTO: 86 FL (ref 79–97)
PLATELET # BLD AUTO: 406 X10E3/UL (ref 150–450)
RBC # BLD AUTO: 4.56 X10E6/UL (ref 3.77–5.28)
TESTOST FREE SERPL-MCNC: 1.5 PG/ML (ref 0–4.2)
TRIGL SERPL-MCNC: 99 MG/DL (ref 0–149)
TSH SERPL DL<=0.005 MIU/L-ACNC: 1.55 UIU/ML (ref 0.45–4.5)
VLDLC SERPL CALC-MCNC: 18 MG/DL (ref 5–40)
WBC # BLD AUTO: 8.4 X10E3/UL (ref 3.4–10.8)

## 2023-11-14 ENCOUNTER — OFFICE VISIT (OUTPATIENT)
Dept: OBSTETRICS AND GYNECOLOGY | Facility: CLINIC | Age: 28
End: 2023-11-14
Payer: COMMERCIAL

## 2023-11-14 VITALS
DIASTOLIC BLOOD PRESSURE: 80 MMHG | BODY MASS INDEX: 43.29 KG/M2 | WEIGHT: 285.6 LBS | SYSTOLIC BLOOD PRESSURE: 128 MMHG | HEIGHT: 68 IN

## 2023-11-14 DIAGNOSIS — Z01.419 ROUTINE GYNECOLOGICAL EXAMINATION: Primary | ICD-10-CM

## 2023-11-14 DIAGNOSIS — E66.9 OBESITY (BMI 30-39.9): ICD-10-CM

## 2023-11-14 RX ORDER — NYSTATIN 100000 [USP'U]/G
POWDER TOPICAL 2 TIMES DAILY
Qty: 180 G | Refills: 0 | Status: SHIPPED | OUTPATIENT
Start: 2023-11-14 | End: 2023-11-17 | Stop reason: SDUPTHER

## 2023-11-14 RX ORDER — DROSPIRENONE AND ETHINYL ESTRADIOL 0.03MG-3MG
1 KIT ORAL DAILY
Qty: 90 TABLET | Refills: 4 | Status: SHIPPED | OUTPATIENT
Start: 2023-11-14 | End: 2024-11-13

## 2023-11-14 NOTE — PROGRESS NOTES
Gynecologic Annual Exam Note        Gynecologic Exam        Subjective     HPI  Piyush Wren is a 28 y.o.  female who presents for annual well woman exam as a established patient. There were no changes to her medical or surgical history since her last visit.. Patient reports problems with:  None . Patient's last menstrual period was 2023 (approximate).. Her periods occur every 25-35 days , lasting 4 days. The flow is moderate.. She reports dysmenorrhea is moderate, occurring first 1-2 days of flow.     Partner Status: Marital Status: single.  She has never been sexually active. STD testing recommendations have been explained to the patient and she does not desire STD testing.    Additional OB/GYN History   Current contraception: contraceptive methods: OCP (estrogen/progesterone)  Desires to: continue contraception  Thromboembolic Disease: none  Age of menarche: Not sure    History of STD: no    Last Pap : 2022. Results: negative. HPV: not done.   Last Completed Pap Smear            PAP SMEAR (Every 3 Years) Next due on 2022  LIQUID-BASED PAP SMEAR, [facility] ([facility])                     History of abnormal Pap smear: no  Gardasil status:completed  Family history of uterine, colon, breast, or ovarian cancer: yes - PGM had breast cancer  Performs monthly Self-Breast Exam: Yes  Exercises Regularly:no  Feelings of Anxiety or Depression: Yes- is currently waiting for new insurance before seeking counseling  Tobacco Usage?: No       Current Outpatient Medications:     Cyanocobalamin (VITAMIN B-12 PO), Take 2 capsules by mouth Daily., Disp: , Rfl:     drospirenone-ethinyl estradiol (Sonia 28) 3-0.03 MG per tablet, Take 1 tablet by mouth Daily., Disp: 90 tablet, Rfl: 4    nystatin (MYCOSTATIN) 922660 UNIT/GM powder, Apply  topically to the appropriate area as directed 2 (Two) Times a Day., Disp: 45 g, Rfl: 0     Patient is requesting refills of Birth control.    OB  History          0    Para   0    Term   0       0    AB   0    Living   0         SAB   0    IAB   0    Ectopic   0    Molar   0    Multiple   0    Live Births   0                Health Maintenance   Topic Date Due    BMI FOLLOWUP  Never done    TDAP/TD VACCINES (1 - Tdap) Never done    HEPATITIS C SCREENING  Never done    ANNUAL PHYSICAL  Never done    INFLUENZA VACCINE  2023    COVID-19 Vaccine (3 -  season) 2023    Annual Gynecologic Pelvic and Breast Exam  11/15/2024    PAP SMEAR  2025    Pneumococcal Vaccine 0-64  Aged Out    CHLAMYDIA SCREENING  Discontinued       Past Medical History:   Diagnosis Date    Abnormal ECG     Depression         Past Surgical History:   Procedure Laterality Date    CHOLECYSTECTOMY N/A 10/12/2018    Procedure: CHOLECYSTECTOMY LAPAROSCOPIC WITH IOC;  Surgeon: Surendra Calderón MD;  Location:  [facility];  Service: General    COMMON BILE DUCT EXPLORATION N/A 10/12/2018    Procedure: COMMON BILE DUCT EXPLORATION;  Surgeon: Surendra Calderón MD;  Location:  [facility];  Service: General    ENDOSCOPY N/A 2018    Procedure: ESOPHAGOGASTRODUODENOSCOPY WITH STENT REMOVAL;  Surgeon: Brunner, Mark I, MD;  Location:  [facility];  Service: Gastroenterology    ERCP N/A 10/07/2018    Procedure: ENDOSCOPIC RETROGRADE CHOLANGIOPANCREATOGRAPHY WITH DEPLOYMENT OF BILLIARY STENT;  Surgeon: Brunner, Mark I, MD;  Location:  [facility];  Service: Gastroenterology    LAPAROSCOPIC CHOLECYSTECTOMY      PILONIDAL CYST DRAINAGE         The additional following portions of the patient's history were reviewed and updated as appropriate: allergies, current medications, past family history, past medical history, past social history, past surgical history, and problem list.    Review of Systems   All other systems reviewed and are negative.        I have reviewed and agree with the HPI, ROS, and historical information as entered above. Sona PHELAN  "MD Filiberto          Objective   /80   Ht 172.1 cm (67.76\")   Wt 130 kg (285 lb 9.6 oz)   LMP 09/20/2023 (Approximate)   BMI 43.74 kg/m²     Physical Exam  General:  well developed; well nourished  no acute distress  Skin:  No suspicious lesions seen  Thyroid: normal to inspection and palpation  Breasts:  Examined in supine position  Symmetric without masses or skin dimpling  Nipples normal without inversion, lesions or discharge  There are no palpable axillary nodes  CVS: RRR, no M/R/G, distal pulses wnl  Resp: CTAB, No W/R/R  Abdomen: soft, non-tender; no masses  no umbilical or inguinal hernias are present  no hepato-splenomegaly  Pelvis: Clinical staff was present for exam  External genitalia:  normal appearance of the external genitalia including Bartholin's and Evaro's glands.  Urethra: no masses or tenderness  Urethral meatus: normal size;  No lesions or signs of prolapse  Bladder: non tender to palpation, no masses, no prolapse  Vagina:  normal pink mucosa without prolapse or lesions.  Cervix:  normal appearance.  Uterus:  normal size, shape and consistency.  Adnexa:  normal bimanual exam of the adnexa.  Perineum/Anus: normal appearance, no external hemorrhoids  Ext: 2+ pulses, no edema       Assessment and Plan    Problem List Items Addressed This Visit       Routine gynecological examination - Primary    Obesity (BMI 30-39.9)       GYN annual well woman exam.   Reviewed pap guidelines.   Encouraged use of condoms for STD prevention.  OCP's/Vaginal Ring - Discussed side effects of nausea, BTB, headaches, breast tenderness and slight weight gain in the first three cycles.  Understands risks of blood clots, stroke, and theoretical risk of breast cancer.  Denies family history of blood clots.  Reviewed monthly self breast exams.  Instructed to call with lumps, pain, or breast discharge.    Reviewed exercise as a preventative health measures.   Return in about 1 year (around 11/14/2024) for Annual " physical.      Sona De Los Santos MD  11/14/2023

## 2023-11-15 ENCOUNTER — TELEPHONE (OUTPATIENT)
Dept: OBSTETRICS AND GYNECOLOGY | Facility: CLINIC | Age: 28
End: 2023-11-15
Payer: COMMERCIAL

## 2023-11-15 NOTE — TELEPHONE ENCOUNTER
I called the patient while I was working on the PA for Nystatin powder. She has not used this medication in the past but has dark skin from yeast under her breasts and Dr. De Los Santos said this would help. Pending for QL.

## 2023-11-17 RX ORDER — NYSTATIN 100000 [USP'U]/G
POWDER TOPICAL 2 TIMES DAILY
Qty: 45 G | Refills: 0 | Status: SHIPPED | OUTPATIENT
Start: 2023-11-17

## 2023-11-17 NOTE — TELEPHONE ENCOUNTER
The PA is approved for/through 2 years. However, at least one other issue must be resolved before your drug is fully approved: your plan covers the drug when it meets [employer] criteria. We are not able to determine why you need this amount. We need to know why three 15 gram tubes per fill is not enough to manage your candidiasis.

## 2023-11-17 NOTE — TELEPHONE ENCOUNTER
Called pharmacy to confirm PA has been applied. Pharmacy tech states the PA had not been applied and copay is $59.

## 2024-10-22 ENCOUNTER — TELEPHONE (OUTPATIENT)
Dept: OBSTETRICS AND GYNECOLOGY | Facility: CLINIC | Age: 29
End: 2024-10-22
Payer: COMMERCIAL

## 2024-10-22 NOTE — TELEPHONE ENCOUNTER
Patient of Dr. De Los Santos; LOV 11/14/23 for annual visit. NOV 11/25/24.  Returned patient's call.   She is requesting refill of OCPs until next visit 11/25/24. She has not contacted her pharmacy to request refill.  Informed her that RX was written 11/14/23 for 3 month supply with 4 refills. Advised her to contact her pharmacy before 11/14/24 to request refill. Call back if she needs further assistance. She v/u and agreed.

## 2024-10-22 NOTE — TELEPHONE ENCOUNTER
PT is calling because she has an annual scheduled for 11/25 and her BC refills will run out before she can get in to be seen and PT would like a one month script to be sent in to hold her over until her annual.

## 2024-11-25 ENCOUNTER — OFFICE VISIT (OUTPATIENT)
Dept: OBSTETRICS AND GYNECOLOGY | Facility: CLINIC | Age: 29
End: 2024-11-25
Payer: COMMERCIAL

## 2024-11-25 VITALS
DIASTOLIC BLOOD PRESSURE: 74 MMHG | WEIGHT: 270 LBS | BODY MASS INDEX: 42.38 KG/M2 | SYSTOLIC BLOOD PRESSURE: 118 MMHG | HEIGHT: 67 IN

## 2024-11-25 DIAGNOSIS — Z01.419 ROUTINE GYNECOLOGICAL EXAMINATION: ICD-10-CM

## 2024-11-25 DIAGNOSIS — E66.01 MORBID OBESITY WITH BMI OF 40.0-44.9, ADULT: Primary | ICD-10-CM

## 2024-11-25 DIAGNOSIS — E28.2 PCOS (POLYCYSTIC OVARIAN SYNDROME): ICD-10-CM

## 2024-11-25 RX ORDER — DROSPIRENONE AND ETHINYL ESTRADIOL 0.03MG-3MG
1 KIT ORAL DAILY
Qty: 90 TABLET | Refills: 4 | Status: SHIPPED | OUTPATIENT
Start: 2024-11-25 | End: 2025-11-25

## 2024-11-25 RX ORDER — FAMOTIDINE 20 MG/1
40 TABLET, FILM COATED ORAL
COMMUNITY
Start: 2024-09-27

## 2024-11-25 RX ORDER — ALBUTEROL SULFATE 90 UG/1
2 INHALANT RESPIRATORY (INHALATION)
COMMUNITY
Start: 2024-09-27

## 2024-11-25 RX ORDER — FEXOFENADINE HCL 180 MG/1
TABLET ORAL
COMMUNITY
Start: 2024-11-21

## 2024-11-25 NOTE — PROGRESS NOTES
Gynecologic Annual Exam Note        Annual Exam        Subjective     HPI  Piyush Wren is a 29 y.o.  female who presents for annual well woman exam as a established patient. There were no changes to her medical or surgical history since her last visit.. Patient's last menstrual period was 2024 (approximate). Her periods occur every 28 DAYS, lasting 4 days.  The flow is MODERATE TO LIGHT. She reports dysmenorrhea is mild occurring premenstrually and first 1-2 days of flow.     Marital Status: single.  She is not currently sexually active. STD testing recommendations have been explained to the patient and she does not desire STD testing.    The patient would like to discuss the following complaints today: NOTHING    Additional OB/GYN History   contraceptive methods: OCP (estrogen/progesterone)  Desires to: continue contraception  Thromboembolic Disease: none  History of migraines: no      History of STD: no    Last Pap : 2022. Results: negative. HPV: unknown .   Last Completed Pap Smear            PAP SMEAR (Every 3 Years) Next due on 2022  LIQUID-BASED PAP SMEAR, [facility] ([facility])                     History of abnormal Pap smear: no  Family history of uterine, colon, breast, or ovarian cancer: yes - PATERNAL GM breast cancer  Performs monthly Self-Breast Exam: yes  Exercises Regularly:no  Feelings of Anxiety or Depression: no  Tobacco Usage?: No       Current Outpatient Medications:     albuterol sulfate  (90 Base) MCG/ACT inhaler, Inhale 2 puffs., Disp: , Rfl:     drospirenone-ethinyl estradiol (Sonia 28) 3-0.03 MG per tablet, Take 1 tablet by mouth Daily., Disp: 90 tablet, Rfl: 4    famotidine (PEPCID) 20 MG tablet, 2 tablets., Disp: , Rfl:     fexofenadine (ALLEGRA) 180 MG tablet, , Disp: , Rfl:     Cyanocobalamin (VITAMIN B-12 PO), Take 2 capsules by mouth Daily., Disp: , Rfl:      Patient is requesting refills of OCP.    OB History          0     Para   0    Term   0       0    AB   0    Living   0         SAB   0    IAB   0    Ectopic   0    Molar   0    Multiple   0    Live Births   0                Health Maintenance   Topic Date Due    BMI FOLLOWUP  Never done    TDAP/TD VACCINES (1 - Tdap) Never done    HEPATITIS C SCREENING  Never done    ANNUAL PHYSICAL  Never done    INFLUENZA VACCINE  2024    COVID-19 Vaccine (3 - - season) 2024    PAP SMEAR  2025    Annual Gynecologic Pelvic and Breast Exam  2025    Pneumococcal Vaccine 0-64  Aged Out    CHLAMYDIA SCREENING  Discontinued       Past Medical History:   Diagnosis Date    Abnormal ECG     Asthma attack         Depression         Past Surgical History:   Procedure Laterality Date    CHOLECYSTECTOMY N/A 10/12/2018    Procedure: CHOLECYSTECTOMY LAPAROSCOPIC WITH IOC;  Surgeon: Surendra Calderón MD;  Location:  [facility];  Service: General    COMMON BILE DUCT EXPLORATION N/A 10/12/2018    Procedure: COMMON BILE DUCT EXPLORATION;  Surgeon: Surendra Calderón MD;  Location:  [facility];  Service: General    ENDOSCOPY N/A 2018    Procedure: ESOPHAGOGASTRODUODENOSCOPY WITH STENT REMOVAL;  Surgeon: Brunner, Mark I, MD;  Location:  [facility];  Service: Gastroenterology    ERCP N/A 10/07/2018    Procedure: ENDOSCOPIC RETROGRADE CHOLANGIOPANCREATOGRAPHY WITH DEPLOYMENT OF BILLIARY STENT;  Surgeon: Brunner, Mark I, MD;  Location:  [facility];  Service: Gastroenterology    LAPAROSCOPIC CHOLECYSTECTOMY      PILONIDAL CYST DRAINAGE         The additional following portions of the patient's history were reviewed and updated as appropriate: allergies, current medications, past family history, past medical history, past social history, past surgical history, and problem list.    Review of Systems   All other systems reviewed and are negative.        I have reviewed and agree with the HPI, ROS, and historical information as entered above. Sona PHELAN  "MD Filiberto          Objective   /74   Ht 170.2 cm (67\")   Wt 122 kg (270 lb)   LMP 11/11/2024 (Approximate)   BMI 42.29 kg/m²     Physical Exam  General:  well developed; well nourished  no acute distress, BMI 42  Skin:  No suspicious lesions seen  Thyroid: normal to inspection and palpation  Breasts:  Examined in supine position  Symmetric without masses or skin dimpling  Nipples normal without inversion, lesions or discharge  There are no palpable axillary nodes  CVS: RRR, no M/R/G, distal pulses wnl  Resp: CTAB, No W/R/R  Abdomen: soft, non-tender; no masses  no umbilical or inguinal hernias are present  no hepato-splenomegaly  Pelvis: Clinical staff was present for exam  External genitalia:  normal appearance of the external genitalia including Bartholin's and [place]on's glands.  Urethra: no masses or tenderness  Urethral meatus: normal size;  No lesions or signs of prolapse  Bladder: non tender to palpation, no masses, no prolapse  Vagina:  normal pink mucosa without prolapse or lesions.  Cervix:  normal appearance.  Uterus:  normal size, shape and consistency.  Adnexa:  normal bimanual exam of the adnexa.  Perineum/Anus: normal appearance, no external hemorrhoids  Ext: 2+ pulses, no edema      Assessment and Plan    Problem List Items Addressed This Visit       Routine gynecological examination    Morbid obesity with BMI of 40.0-44.9, adult - Primary    Relevant Orders    Ambulatory Referral to Endocrinology    PCOS (polycystic ovarian syndrome)    Relevant Orders    DHEA-Sulfate (Completed)    Insulin, Random    CBC (No Diff) (Completed)    17-Hydroxyprogesterone (Completed)    TSH (Completed)    Testosterone Free Direct (Completed)    Lipid Panel (Completed)    Hemoglobin A1c (Completed)    Ambulatory Referral to Endocrinology       GYN annual well woman exam.   Reviewed pap guidelines.   OCP's/Vaginal Ring - Discussed side effects of nausea, BTB, headaches, breast tenderness and slight weight gain " in the first three cycles.  Understands risks of blood clots, stroke, and theoretical risk of breast cancer.  Denies family history of blood clots.  Reviewed monthly self breast exams.  Instructed to call with lumps, pain, or breast discharge.    Reviewed BMI and weight loss as preventative health measures.   Reviewed exercise as a preventative health measures.   Return in about 1 year (around 11/25/2025) for Annual physical.    Sona De Los Santos MD  11/25/2024

## 2024-11-26 LAB
CHOLEST SERPL-MCNC: 178 MG/DL (ref 100–199)
DHEA-S SERPL-MCNC: 305 UG/DL (ref 84.8–378)
ERYTHROCYTE [DISTWIDTH] IN BLOOD BY AUTOMATED COUNT: 13.2 % (ref 11.7–15.4)
HBA1C MFR BLD: 5.3 % (ref 4.8–5.6)
HCT VFR BLD AUTO: 39.7 % (ref 34–46.6)
HDLC SERPL-MCNC: 54 MG/DL
HGB BLD-MCNC: 12.9 G/DL (ref 11.1–15.9)
LDLC SERPL CALC-MCNC: 97 MG/DL (ref 0–99)
MCH RBC QN AUTO: 26.8 PG (ref 26.6–33)
MCHC RBC AUTO-ENTMCNC: 32.5 G/DL (ref 31.5–35.7)
MCV RBC AUTO: 82 FL (ref 79–97)
PLATELET # BLD AUTO: 463 X10E3/UL (ref 150–450)
RBC # BLD AUTO: 4.82 X10E6/UL (ref 3.77–5.28)
TRIGL SERPL-MCNC: 158 MG/DL (ref 0–149)
TSH SERPL DL<=0.005 MIU/L-ACNC: 1.82 UIU/ML (ref 0.45–4.5)
VLDLC SERPL CALC-MCNC: 27 MG/DL (ref 5–40)
WBC # BLD AUTO: 10.4 X10E3/UL (ref 3.4–10.8)

## 2024-11-28 LAB — TESTOST FREE SERPL-MCNC: 2.2 PG/ML (ref 0–4.2)

## 2024-11-29 LAB — 17OHP SERPL-MCNC: 15 NG/DL

## 2024-12-09 LAB — INSULIN SERPL-ACNC: 25 UIU/ML

## 2025-06-30 ENCOUNTER — OFFICE VISIT (OUTPATIENT)
Dept: ENDOCRINOLOGY | Facility: CLINIC | Age: 30
End: 2025-06-30
Payer: COMMERCIAL

## 2025-06-30 VITALS
BODY MASS INDEX: 43.32 KG/M2 | HEART RATE: 96 BPM | DIASTOLIC BLOOD PRESSURE: 80 MMHG | OXYGEN SATURATION: 99 % | RESPIRATION RATE: 17 BRPM | WEIGHT: 276 LBS | SYSTOLIC BLOOD PRESSURE: 136 MMHG | HEIGHT: 67 IN

## 2025-06-30 DIAGNOSIS — E66.9 OBESITY (BMI 30-39.9): ICD-10-CM

## 2025-06-30 DIAGNOSIS — E28.2 PCOS (POLYCYSTIC OVARIAN SYNDROME): Primary | ICD-10-CM

## 2025-06-30 PROCEDURE — 99204 OFFICE O/P NEW MOD 45 MIN: CPT | Performed by: INTERNAL MEDICINE

## 2025-06-30 RX ORDER — METFORMIN HYDROCHLORIDE 500 MG/1
500 TABLET, EXTENDED RELEASE ORAL 2 TIMES DAILY
Qty: 60 TABLET | Refills: 5 | Status: SHIPPED | OUTPATIENT
Start: 2025-06-30

## 2025-06-30 NOTE — ASSESSMENT & PLAN NOTE
Diagnosed age 18 - missed menses and hirsutism confirm diagnosis.   On OCPs.   Discussed management. Lifestyle changes with focus on weight management is primary treatment. Option to try metformin, which could improve weight and insulin resistance.   Pt would like a script. Cautioned for possible GI side effects.   Start one tab daily and increase to twice daily after a week if tolerated.   Offered spironolactone for hirsutism. Pt declined at this time.   Continue OCPs.   Consider cindy-inositol supplement.   Nutrition referral.

## 2025-06-30 NOTE — ASSESSMENT & PLAN NOTE
BMI 43.2.   Weight loss advised. Can monitor caloric intake. Less takeout/fast food.   Referred to nutrition.

## 2025-06-30 NOTE — PROGRESS NOTES
Chief Complaint   Patient presents with    Polycystic Ovary Syndrome    Obesity        Referring Provider  Sona De Los Santos*     HPI   Piyush Wren is a 29 y.o. female had concerns including Polycystic Ovary Syndrome and Obesity.      New patient referred by OB/GYN for PCOS and obesity.    She is on OCPs.  A1c has been normal.  DHEA-S normal.  Insulin level elevated.    PCOS diagnosed age 18. Has been doing fine on OCPs.     Hasn't been on metformin.     Menses were irregular - would miss cycles for up to 6 months then very heavy cycle.   Difficulty with weight gain.   Has hirsutism (chin mostly). In the past was waxing. Plucking chin hairs and dermaplane.   Isn't particularly bothered by it.     Weight is up 6 lbs since November 2024.  Would like to lose weight.     Diet: not following any diet in particular. Last year had issue with possible food allergy (red meat, dairy, soy, nuts) but didn't notice much difference in symptoms.   Doesn't love cooking so picking up takeout regularly.   Tried meal services in the past but issues with delivery.   Doesn't eat much red meat.   Has not met with nutitrionist.     Is exercising inconsistently. Has some insecurities about exercising.       Past Medical History:   Diagnosis Date    Abnormal ECG     Asthma attack     2024    Depression     Obesity, Class III, BMI 40-49.9 (morbid obesity)     Polycystic ovary syndrome      Past Surgical History:   Procedure Laterality Date    CHOLECYSTECTOMY N/A 10/12/2018    Procedure: CHOLECYSTECTOMY LAPAROSCOPIC WITH IOC;  Surgeon: Surendra Calderón MD;  Location:  [facility];  Service: General    COMMON BILE DUCT EXPLORATION N/A 10/12/2018    Procedure: COMMON BILE DUCT EXPLORATION;  Surgeon: Surendra Calderón MD;  Location:  [facility];  Service: General    ENDOSCOPY N/A 11/08/2018    Procedure: ESOPHAGOGASTRODUODENOSCOPY WITH STENT REMOVAL;  Surgeon: Brunner, Mark I, MD;  Location:  [facility];  Service:  "Gastroenterology    ERCP N/A 10/07/2018    Procedure: ENDOSCOPIC RETROGRADE CHOLANGIOPANCREATOGRAPHY WITH DEPLOYMENT OF BILLIARY STENT;  Surgeon: Brunner, Mark I, MD;  Location: [facility];  Service: Gastroenterology    LAPAROSCOPIC CHOLECYSTECTOMY      PILONIDAL CYST DRAINAGE  2013    WISDOM TOOTH EXTRACTION  2/3/2023      Family History   Problem Relation Age of Onset    Hyperlipidemia Father     Heart disease Father     Hypertension Father     COPD Mother     Thyroid disease Mother     Lung cancer Paternal Grandfather     Breast cancer Paternal Grandmother     Cancer Paternal Grandmother     Lung cancer Maternal Grandfather       Social History     Socioeconomic History    Marital status: Single    Number of children: 0   Tobacco Use    Smoking status: Never    Smokeless tobacco: Never   Substance and Sexual Activity    Alcohol use: Not Currently     Comment: rare/occasional    Drug use: No    Sexual activity: Not Currently     Birth control/protection: OCP      Allergies   Allergen Reactions    Penicillins Rash      Current Outpatient Medications on File Prior to Visit   Medication Sig Dispense Refill    albuterol sulfate  (90 Base) MCG/ACT inhaler Inhale 2 puffs.      Cyanocobalamin (VITAMIN B-12 PO) Take 2 capsules by mouth Daily.      drospirenone-ethinyl estradiol (Sonia 28) 3-0.03 MG per tablet Take 1 tablet by mouth Daily. 90 tablet 4    famotidine (PEPCID) 20 MG tablet 2 tablets.      fexofenadine (ALLEGRA) 180 MG tablet        No current facility-administered medications on file prior to visit.        Review of Systems   Constitutional: Negative.    Endocrine: Negative.         /80 (BP Location: Right arm, Patient Position: Sitting, Cuff Size: Adult)   Pulse 96   Resp 17   Ht 170.2 cm (67.01\")   Wt 125 kg (276 lb)   SpO2 99%   BMI 43.22 kg/m²      Physical Exam    Constitutional:  well developed; well nourished  no acute distress  obese - Body mass index is 43.22 kg/m². "   ENT/Thyroid: no thyromegaly  no palpable nodules   Eyes: EOM intact  Conjunctiva: clear   Respiratory:  breathing is unlabored  clear to auscultation bilaterally   Cardiovascular:  regular rate and rhythm, S1, S2 normal, no murmur, click, rub or gallop   Chest:  Not performed.   Abdomen: Not performed.   : Not performed.   Musculoskeletal: negative findings:  ROM of all joints is normal, no deformities present   Skin: dry and warm, hirsutism, chin   Neuro: normal without focal findings and mental status, speech normal, alert and oriented x3   Psych: oriented to time, place and person, mood and affect are within normal limits     LABS AND IMAGING  A1C:  Lab Results   Component Value Date    HGBA1C 5.3 11/25/2024    HGBA1C 5.00 07/30/2021      Glucose:  Lab Results   Component Value Date    POCGLU 88 10/12/2018      CMP:  Lab Results   Component Value Date    GLUCOSE 78 07/30/2021    BUN 6 07/30/2021    CREATININE 0.63 07/30/2021     07/30/2021    K 4.1 07/30/2021     07/30/2021    CALCIUM 9.5 07/30/2021    PROTEINTOT 6.8 07/30/2021    ALBUMIN 3.90 07/30/2021    ALT 14 07/30/2021    AST 16 07/30/2021    ALKPHOS 77 07/30/2021    BILITOT 0.3 07/30/2021    GLOB 2.9 07/30/2021    AGRATIO 1.3 07/30/2021    BCR 9.5 07/30/2021    ANIONGAP 6.0 10/14/2018      Lipid Panel:  Lab Results   Component Value Date    CHLPL 178 11/25/2024    TRIG 158 (H) 11/25/2024    HDL 54 11/25/2024    LDL 97 11/25/2024     TSH:  Lab Results   Component Value Date    TSH 1.820 11/25/2024      Lab Results   Component Value Date    DHEASO4 305.0 11/25/2024    17OHPROGGEST 15 11/25/2024    TESTFRE 2.2 11/25/2024    LABINSU 25 (H) 11/25/2024       Assessment and Plan    Diagnoses and all orders for this visit:    1. PCOS (polycystic ovarian syndrome) (Primary)  Assessment & Plan:  Diagnosed age 18 - missed menses and hirsutism confirm diagnosis.   On OCPs.   Discussed management. Lifestyle changes with focus on weight management is  primary treatment. Option to try metformin, which could improve weight and insulin resistance.   Pt would like a script. Cautioned for possible GI side effects.   Start one tab daily and increase to twice daily after a week if tolerated.   Offered spironolactone for hirsutism. Pt declined at this time.   Continue OCPs.   Consider cindy-inositol supplement.   Nutrition referral.     Orders:  -     metFORMIN ER (GLUCOPHAGE-XR) 500 MG 24 hr tablet; Take 1 tablet by mouth 2 (Two) Times a Day. Start one tab daily x 7 days, then increase to twice daily  Dispense: 60 tablet; Refill: 5  -     Ambulatory Referral to Nutrition Services    2. Obesity (BMI 30-39.9)  Assessment & Plan:  BMI 43.2.   Weight loss advised. Can monitor caloric intake. Less takeout/fast food.   Referred to nutrition.            Return in 6 months (on 12/30/2025). The patient was instructed to contact the clinic with any interval questions or concerns.    Electronically signed by: Nicole Traore DO   Endocrinologist    Please note that portions of this note were completed with a voice recognition program.

## 2025-07-02 ENCOUNTER — PATIENT ROUNDING (BHMG ONLY) (OUTPATIENT)
Dept: ENDOCRINOLOGY | Facility: CLINIC | Age: 30
End: 2025-07-02
Payer: COMMERCIAL

## 2025-07-10 ENCOUNTER — HOSPITAL ENCOUNTER (OUTPATIENT)
Dept: NUTRITION | Facility: HOSPITAL | Age: 30
Setting detail: RECURRING SERIES
Discharge: HOME | End: 2025-07-10
Payer: COMMERCIAL

## 2025-07-10 PROCEDURE — 97802 MEDICAL NUTRITION INDIV IN: CPT

## 2025-07-10 NOTE — CONSULTS
[facility]          Initial 60 Minute Nutrition Visit    Date: 07/10/2025   Patient Name: Piyush Wren  : 1995   MRN: [id number]   Referring Provider: Nicole Traore DO    Reason for Visit: PCOS  Visit Format: In person    Nutrition Assessment       Social History:   Social History     Socioeconomic History    Marital status: Single    Number of children: 0   Tobacco Use    Smoking status: Never    Smokeless tobacco: Never   Substance and Sexual Activity    Alcohol use: Not Currently     Comment: rare/occasional    Drug use: No    Sexual activity: Not Currently     Birth control/protection: OCP     Active Problem List:   Patient Active Problem List    Diagnosis     Morbid obesity with BMI of 40.0-44.9, adult [E66.01, Z68.41]     PCOS (polycystic ovarian syndrome) [E28.2]     Anxiety [F41.9]     Screening for STDs (sexually transmitted diseases) [Z11.3]     Routine gynecological examination [Z01.419]     Obesity (BMI 30-39.9) [E66.9]     Elevated liver enzymes [R74.8]     Hyperbilirubinemia [E80.6]     Cholelithiasis [K80.20]     Intractable abdominal pain [R10.9]     Jaundice [R17]     Choledocholithiasis [K80.50]       Current Medications:   Current Outpatient Medications:     albuterol sulfate  (90 Base) MCG/ACT inhaler, Inhale 2 puffs., Disp: , Rfl:     Cyanocobalamin (VITAMIN B-12 PO), Take 2 capsules by mouth Daily., Disp: , Rfl:     drospirenone-ethinyl estradiol (Sonia 28) 3-0.03 MG per tablet, Take 1 tablet by mouth Daily., Disp: 90 tablet, Rfl: 4    famotidine (PEPCID) 20 MG tablet, 2 tablets., Disp: , Rfl:     fexofenadine (ALLEGRA) 180 MG tablet, , Disp: , Rfl:     metFORMIN ER (GLUCOPHAGE-XR) 500 MG 24 hr tablet, Take 1 tablet by mouth 2 (Two) Times a Day. Start one tab daily x 7 days, then increase to twice daily, Disp: 60 tablet, Rfl: 5    Recent Pertinent Labs: HgbA1c 5.3%    Hunger Vital Sign Food Insecurity Assessment:  Within the past 12  "months I/we worried whether our food would run out before I/we got money to buy more: No   Within the past 12 months the food I/we bought just didn't last and I/we didn't have money to get more: No   Use of food assistance programs (WIC, food stamps, food rivera) No       Food & Nutrition Related History       Food Allergies: None  Food Intolerances: None noted  Food Behavior: Patient usually skips bfast  Nutrition Impact Symptoms: None  Gastrointestinal conditions that impact intake or food choices: None  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, works as equine    Who prepares most meals: patient  Who does grocery shopping: patient  How many meals are purchased from fast food/sit down restaurants per week: 4-5  Difficulty chewin - Normal  Difficulty swallowin - Normal  Diet requirement related to personal preference or cultural belief: None  History of eating disorder/disordered eating habits: None  Language/communication details: English  Barriers to learning: None    24 Hour Recall: see pre-appt questionnaire for details  Time Food/beverages consumed                                     Additional comments: Patient eats out often and does not enjoy intensive cooking    Anthropometrics      Height:   Ht Readings from Last 1 Encounters:   25 170.2 cm (67.01\")     Weight:   Wt Readings from Last 3 Encounters:   25 125 kg (276 lb)   24 122 kg (270 lb)   23 130 kg (285 lb 9.6 oz)     BMI: There is no height or weight on file to calculate BMI.   Weight Change: None     Physical Activity     Barriers to physical activity: None     Physical activity comments: None noted     Estimated Needs     Estimated Energy Needs: Did not calc for this appt    Estimated Protein Needs: 100-125g/day     Estimated Fluid Needs: At least 64 oz/day     Discussion / Education      Piyush Wren is a 29 y.o. female who has been referred for PCOS management. Her primary motivation is to improve " "confidence, reduce risk of chronic disease/long term complications, improve energy levels, and reduce need for medications. Her primary barriers to change are portion control, time management, convenience, lack of cooking skill/resources, emotional eating, overcoming cravings, and consistency. She lives alone. Cooking and grocery shopping are done by patient. She dines out 4-5 times per week. She has not previously seen a dietitian/nutritionist.     RD provided education about the three macronutrients and the roles of each for promoting good health and balance. Patient was able to identify several foods in each category that they consume regularly. RD emphasized the importance of incorporating a variety of sources for each, as well as increasing fiber rich choices such as whole grains and vegetables/fruits to improve blood sugar, cholesterol, and satiety. RD reviewed the different types of dietary fat and the roles of each in managing cholesterol and reducing risks of heart disease and other complications. RD provided a visual of a \"balanced meal\" with adequate portions of carbohydrates, protein, and fruits/vegetables. Patient compared the visual to their own meals and was able to identify some areas for improved balance and portion sizes. RD also discussed the importance of eating a protein and fiber rich breakfast to improve energy levels and blood glucose throughout the rest of the day.    RD and patient worked to set several goals for patient. RD provided contact info and encouraged patient to reach out with any additional questions or concerns following the appointment.    Assessment of patient engagement: Engaged    Measurement of understanding: Patient verbalized understanding, Patient able to demonstrate understanding with teach back     Resources Provided:  Weight management packet    Goal (s)      Goal 1: Eat breakfast 4 days/week minimum   Goal 2: Protein + fiber + carb with meals   Goal 3: Review " nutrition labels for excessive saturated fat, added sugar, and trans fat.     Plan of Care     PES Statement:   Overweight / Obesity related to diet and lifestye as evidenced by BMI.     Follow Up Visit      Follow Up scheduled for 8/19 @ 8:45 am    Total of 60 minutes spent with patient on nutrition counseling. Education based on Academy of Nutrition and Dietetics guidelines. Patient was provided with RD's contact information. Thank you for this referral.

## 2025-08-19 ENCOUNTER — HOSPITAL ENCOUNTER (OUTPATIENT)
Dept: NUTRITION | Facility: HOSPITAL | Age: 30
Setting detail: RECURRING SERIES
Discharge: HOME | End: 2025-08-19

## (undated) DEVICE — ENCORE® LATEX MICRO SIZE 8, STERILE LATEX POWDER-FREE SURGICAL GLOVE: Brand: ENCORE

## (undated) DEVICE — GOWN,PREVENTION PLUS,XXLARGE,STERILE: Brand: MEDLINE

## (undated) DEVICE — ENDOCUT SCISSOR TIP, DISPOSABLE: Brand: RENEW

## (undated) DEVICE — Device

## (undated) DEVICE — SPHINCTEROTOME: Brand: DREAMTOME™ RX 44

## (undated) DEVICE — ERBE NESSY®PLATE 170 SPLIT; 168CM²; CABLE 3M: Brand: ERBE

## (undated) DEVICE — THE BITE BLOCK MAXI, LATEX FREE STRAP IS USED TO PROTECT THE ENDOSCOPE INSERTION TUBE FROM BEING BITTEN BY THE PATIENT.

## (undated) DEVICE — GW FUSN LPTIP .035IN 260CM

## (undated) DEVICE — PK LAP LASR CHOLE 10

## (undated) DEVICE — ENDOPATH XCEL UNIVERSAL TROCAR STABLILITY SLEEVES: Brand: ENDOPATH XCEL

## (undated) DEVICE — COVER,LIGHT HANDLE,FLX,1/PK: Brand: MEDLINE INDUSTRIES, INC.

## (undated) DEVICE — SOL LR 1000ML

## (undated) DEVICE — MEDI-VAC NON-CONDUCTIVE SUCTION TUBING: Brand: CARDINAL HEALTH

## (undated) DEVICE — ENDOPOUCH RETRIEVER SPECIMEN RETRIEVAL BAGS: Brand: ENDOPOUCH RETRIEVER

## (undated) DEVICE — ENDOPATH XCEL BLADELESS TROCARS WITH STABILITY SLEEVES: Brand: ENDOPATH XCEL

## (undated) DEVICE — SKIN AFFIX SURG ADHESIVE 72/CS 0.55ML: Brand: MEDLINE

## (undated) DEVICE — THE DISPOSABLE RAPTOR GRASPING DEVICE IS USED TO GRASP TISSUE AND/OR RETRIEVE FOREIGN BODIES, EXCISED TISSUE AND STENTS DURING ENDOSCOPIC PROCEDURES.: Brand: RAPTOR

## (undated) DEVICE — GLV SURG BIOGEL LTX PF 7 1/2

## (undated) DEVICE — RETRIEVAL BALLOON CATHETER: Brand: EXTRACTOR™ PRO RX

## (undated) DEVICE — CANNULA,OXY,ADULT,SUPERSOFT,W/7'TUB,UC: Brand: MEDLINE

## (undated) DEVICE — SNAR POLYP SENSATION MICRO OVL 13 240X20

## (undated) DEVICE — 3M™ IOBAN™ 2 ANTIMICROBIAL INCISE DRAPE 6650EZ: Brand: IOBAN™ 2

## (undated) DEVICE — SINGLE PORT MANIFOLD: Brand: NEPTUNE 2

## (undated) DEVICE — SYS CLS PORTSITE CT CLOSESURE 5AND10/12

## (undated) DEVICE — DEV LK WIREGUIDE FUSN OLYMP SCP

## (undated) DEVICE — MEDI-VAC YANKAUER SUCTION HANDLE W/BULBOUS TIP: Brand: CARDINAL HEALTH

## (undated) DEVICE — SNAP KOVER: Brand: UNBRANDED

## (undated) DEVICE — 4-PORT MANIFOLD: Brand: NEPTUNE 2

## (undated) DEVICE — SUT VIC 0 TIES 18IN J912G

## (undated) DEVICE — SUT MNCRYL PLS ANTIB UD 4/0 PS2 18IN

## (undated) DEVICE — VISUALIZATION SYSTEM: Brand: CLEARIFY

## (undated) DEVICE — ENDOPATH PNEUMONEEDLE INSUFFLATION NEEDLES WITH LUER LOCK CONNECTORS 120MM: Brand: ENDOPATH

## (undated) DEVICE — DRSNG WND BORDR/ADHS NONADHR/GZ LF 2X2IN STRL

## (undated) DEVICE — MARYLAND JAW LAPAROSCOPIC SEALER/DIVIDER COATED: Brand: LIGASURE